# Patient Record
Sex: FEMALE | Race: WHITE | NOT HISPANIC OR LATINO | Employment: FULL TIME | URBAN - METROPOLITAN AREA
[De-identification: names, ages, dates, MRNs, and addresses within clinical notes are randomized per-mention and may not be internally consistent; named-entity substitution may affect disease eponyms.]

---

## 2017-11-14 ENCOUNTER — HOSPITAL ENCOUNTER (EMERGENCY)
Facility: HOSPITAL | Age: 27
Discharge: HOME | End: 2017-11-14
Admitting: EMERGENCY MEDICINE
Payer: COMMERCIAL

## 2017-11-14 ENCOUNTER — APPOINTMENT (EMERGENCY)
Dept: CT IMAGING | Facility: HOSPITAL | Age: 27
End: 2017-11-14
Payer: COMMERCIAL

## 2017-11-14 VITALS
HEART RATE: 78 BPM | OXYGEN SATURATION: 100 % | BODY MASS INDEX: 18.99 KG/M2 | WEIGHT: 114 LBS | SYSTOLIC BLOOD PRESSURE: 110 MMHG | TEMPERATURE: 98.4 F | RESPIRATION RATE: 16 BRPM | DIASTOLIC BLOOD PRESSURE: 59 MMHG | HEIGHT: 65 IN

## 2017-11-14 DIAGNOSIS — N12 PYELONEPHRITIS: Primary | ICD-10-CM

## 2017-11-14 LAB
ALBUMIN SERPL BCP-MCNC: 4.3 G/DL (ref 3.5–5)
ALP SERPL-CCNC: 72 U/L (ref 46–116)
ALT SERPL W P-5'-P-CCNC: 24 U/L (ref 12–78)
ANION GAP SERPL CALCULATED.3IONS-SCNC: 7 MMOL/L (ref 4–13)
AST SERPL W P-5'-P-CCNC: 17 U/L (ref 5–45)
BACTERIA UR QL AUTO: ABNORMAL /HPF
BASOPHILS # BLD AUTO: 0.03 THOUSANDS/ΜL (ref 0–0.1)
BASOPHILS NFR BLD AUTO: 1 % (ref 0–1)
BILIRUB SERPL-MCNC: 0.3 MG/DL (ref 0.2–1)
BILIRUB UR QL STRIP: NEGATIVE
BUN SERPL-MCNC: 13 MG/DL (ref 5–25)
CALCIUM SERPL-MCNC: 9.3 MG/DL (ref 8.3–10.1)
CHLORIDE SERPL-SCNC: 104 MMOL/L (ref 100–108)
CLARITY UR: CLEAR
CO2 SERPL-SCNC: 34 MMOL/L (ref 21–32)
COLOR UR: ABNORMAL
CREAT SERPL-MCNC: 1 MG/DL (ref 0.6–1.3)
EOSINOPHIL # BLD AUTO: 0.04 THOUSAND/ΜL (ref 0–0.61)
EOSINOPHIL NFR BLD AUTO: 1 % (ref 0–6)
ERYTHROCYTE [DISTWIDTH] IN BLOOD BY AUTOMATED COUNT: 11.8 % (ref 11.6–15.1)
EXT PREG TEST URINE: NORMAL
GFR SERPL CREATININE-BSD FRML MDRD: 77 ML/MIN/1.73SQ M
GLUCOSE SERPL-MCNC: 87 MG/DL (ref 65–140)
GLUCOSE UR STRIP-MCNC: ABNORMAL MG/DL
HCT VFR BLD AUTO: 43.3 % (ref 34.8–46.1)
HGB BLD-MCNC: 14.8 G/DL (ref 11.5–15.4)
HGB UR QL STRIP.AUTO: ABNORMAL
KETONES UR STRIP-MCNC: NEGATIVE MG/DL
LEUKOCYTE ESTERASE UR QL STRIP: ABNORMAL
LYMPHOCYTES # BLD AUTO: 1.22 THOUSANDS/ΜL (ref 0.6–4.47)
LYMPHOCYTES NFR BLD AUTO: 19 % (ref 14–44)
MCH RBC QN AUTO: 28.8 PG (ref 26.8–34.3)
MCHC RBC AUTO-ENTMCNC: 34.2 G/DL (ref 31.4–37.4)
MCV RBC AUTO: 84 FL (ref 82–98)
MONOCYTES # BLD AUTO: 0.37 THOUSAND/ΜL (ref 0.17–1.22)
MONOCYTES NFR BLD AUTO: 6 % (ref 4–12)
NEUTROPHILS # BLD AUTO: 4.85 THOUSANDS/ΜL (ref 1.85–7.62)
NEUTS SEG NFR BLD AUTO: 74 % (ref 43–75)
NITRITE UR QL STRIP: POSITIVE
NON-SQ EPI CELLS URNS QL MICRO: ABNORMAL /HPF
NRBC BLD AUTO-RTO: 0 /100 WBCS
PH UR STRIP.AUTO: 5.5 [PH] (ref 4.5–8)
PLATELET # BLD AUTO: 192 THOUSANDS/UL (ref 149–390)
PMV BLD AUTO: 9.4 FL (ref 8.9–12.7)
POTASSIUM SERPL-SCNC: 3.7 MMOL/L (ref 3.5–5.3)
PROT SERPL-MCNC: 7.9 G/DL (ref 6.4–8.2)
PROT UR STRIP-MCNC: ABNORMAL MG/DL
RBC # BLD AUTO: 5.14 MILLION/UL (ref 3.81–5.12)
RBC #/AREA URNS AUTO: ABNORMAL /HPF
SODIUM SERPL-SCNC: 145 MMOL/L (ref 136–145)
SP GR UR STRIP.AUTO: <=1.005 (ref 1–1.03)
UROBILINOGEN UR QL STRIP.AUTO: 4 E.U./DL
WBC # BLD AUTO: 6.53 THOUSAND/UL (ref 4.31–10.16)
WBC #/AREA URNS AUTO: ABNORMAL /HPF

## 2017-11-14 PROCEDURE — 96365 THER/PROPH/DIAG IV INF INIT: CPT

## 2017-11-14 PROCEDURE — 80053 COMPREHEN METABOLIC PANEL: CPT | Performed by: PHYSICIAN ASSISTANT

## 2017-11-14 PROCEDURE — 99284 EMERGENCY DEPT VISIT MOD MDM: CPT

## 2017-11-14 PROCEDURE — 85025 COMPLETE CBC W/AUTO DIFF WBC: CPT | Performed by: PHYSICIAN ASSISTANT

## 2017-11-14 PROCEDURE — 81001 URINALYSIS AUTO W/SCOPE: CPT | Performed by: PHYSICIAN ASSISTANT

## 2017-11-14 PROCEDURE — 96375 TX/PRO/DX INJ NEW DRUG ADDON: CPT

## 2017-11-14 PROCEDURE — 87086 URINE CULTURE/COLONY COUNT: CPT | Performed by: PHYSICIAN ASSISTANT

## 2017-11-14 PROCEDURE — 36415 COLL VENOUS BLD VENIPUNCTURE: CPT | Performed by: PHYSICIAN ASSISTANT

## 2017-11-14 PROCEDURE — 81025 URINE PREGNANCY TEST: CPT | Performed by: PHYSICIAN ASSISTANT

## 2017-11-14 PROCEDURE — 74176 CT ABD & PELVIS W/O CONTRAST: CPT

## 2017-11-14 PROCEDURE — 96361 HYDRATE IV INFUSION ADD-ON: CPT

## 2017-11-14 RX ORDER — KETOROLAC TROMETHAMINE 30 MG/ML
30 INJECTION, SOLUTION INTRAMUSCULAR; INTRAVENOUS ONCE
Status: COMPLETED | OUTPATIENT
Start: 2017-11-14 | End: 2017-11-14

## 2017-11-14 RX ORDER — ONDANSETRON 2 MG/ML
4 INJECTION INTRAMUSCULAR; INTRAVENOUS ONCE
Status: COMPLETED | OUTPATIENT
Start: 2017-11-14 | End: 2017-11-14

## 2017-11-14 RX ORDER — ONDANSETRON 4 MG/1
4 TABLET, ORALLY DISINTEGRATING ORAL EVERY 8 HOURS PRN
Qty: 20 TABLET | Refills: 0 | Status: SHIPPED | OUTPATIENT
Start: 2017-11-14 | End: 2017-11-21

## 2017-11-14 RX ADMIN — CEFTRIAXONE 1000 MG: 1 INJECTION, POWDER, FOR SOLUTION INTRAMUSCULAR; INTRAVENOUS at 21:28

## 2017-11-14 RX ADMIN — ONDANSETRON 4 MG: 2 INJECTION INTRAMUSCULAR; INTRAVENOUS at 19:29

## 2017-11-14 RX ADMIN — KETOROLAC TROMETHAMINE 30 MG: 30 INJECTION, SOLUTION INTRAMUSCULAR at 19:29

## 2017-11-14 RX ADMIN — SODIUM CHLORIDE 1000 ML: 0.9 INJECTION, SOLUTION INTRAVENOUS at 19:29

## 2017-11-15 NOTE — DISCHARGE INSTRUCTIONS
Kidney Infection   WHAT YOU NEED TO KNOW:   A kidney infection, or pyelonephritis, is a bacterial infection  The infection usually starts in your bladder or urethra and moves into your kidney  One or both kidneys may be infected  DISCHARGE INSTRUCTIONS:   Return to the emergency department if:   · You have a fever and chills  · You cannot stop vomiting  · You have severe pain in your abdomen, lower back, or sides  Contact your healthcare provider if:   · You continue to have a fever after you take antibiotics for 3 days  · You have pain when you urinate, even after treatment  · Your signs and symptoms return  · You have questions or concerns about your condition or care  Medicines: You may  need any of the following:  · Antibiotics  treat your bacterial infection  · Acetaminophen  decreases pain and fever  It is available without a doctor's order  Ask how much to take and how often to take it  Follow directions  Read the labels of all other medicines you are using to see if they also contain acetaminophen, or ask your doctor or pharmacist  Acetaminophen can cause liver damage if not taken correctly  Do not use more than 4 grams (4,000 milligrams) total of acetaminophen in one day  · NSAIDs , such as ibuprofen, help decrease swelling, pain, and fever  This medicine is available with or without a doctor's order  NSAIDs can cause stomach bleeding or kidney problems in certain people  If you take blood thinner medicine, always ask if NSAIDs are safe for you  Always read the medicine label and follow directions  Do not give these medicines to children under 10months of age without direction from your child's healthcare provider  · Prescription pain medicine  may be given  Ask how to take this medicine safely  · Take your medicine as directed  Contact your healthcare provider if you think your medicine is not helping or if you have side effects   Tell him of her if you are allergic to any medicine  Keep a list of the medicines, vitamins, and herbs you take  Include the amounts, and when and why you take them  Bring the list or the pill bottles to follow-up visits  Carry your medicine list with you in case of an emergency  Drink liquids as directed: You may need to drink extra liquids to help flush your kidneys and urinary system  Water is the best liquid to drink  Ask your healthcare provider how much liquid to drink each day and which liquids are best for you  Urinate as soon as you feel the urge: This will help flush bacteria from your urinary system  Do not wait or hold your urine for too long  Clean your genital area every day with soap and water:  Wipe from front to back after you urinate or have a bowel movement  Wear cotton underwear  Fabrics such as nylon and polyester can stay damp  This can increase your risk for infection  Urinate within 15 minutes after you have sex  Follow up with your healthcare provider as directed:  Write down your questions so you remember to ask them during your visits  © 2017 [facility] Information is for End User's use only and may not be sold, redistributed or otherwise used for commercial purposes  All illustrations and images included in CareNotes® are the copyrighted property of A D A M , Inc  or Miguelito Farrell  The above information is an  only  It is not intended as medical advice for individual conditions or treatments  Talk to your doctor, nurse or pharmacist before following any medical regimen to see if it is safe and effective for you

## 2017-11-15 NOTE — ED PROVIDER NOTES
History  Chief Complaint   Patient presents with    Abdominal Pain     Patient c/o abdominal pain that started last night after she ate  Patient states she is also having some lower back pain  She states after eating tonight she experienced the same pain in her stomach and back  Patient is also c/o nausea  Leilani Farrell is a 32 y o  female w PMH none significant who presents for evaluation of abdominal pain  Patient developed some lower abdominal pain last evening  It is located to the left lower quadrant but after eating this morning she then had severe pain in the lower back bilaterally  The pain of the lower back does radiate around to anterior aspect of the abdomen in the lower quadrants  Has some increased urinary frequency and was treated yesterday at the urgent care with Cipro for a urinary tract infection  Took 1 dose  No hematuria  None       History reviewed  No pertinent past medical history  History reviewed  No pertinent surgical history  History reviewed  No pertinent family history  I have reviewed and agree with the history as documented  Social History   Substance Use Topics    Smoking status: Former Smoker    Smokeless tobacco: Never Used    Alcohol use No        Review of Systems   Constitutional: Negative for chills, diaphoresis and fever  HENT: Negative for congestion and sore throat  Eyes: Negative for visual disturbance  Respiratory: Negative for cough, chest tightness, shortness of breath and wheezing  Cardiovascular: Negative for chest pain and leg swelling  Gastrointestinal: Positive for abdominal pain and nausea  Negative for constipation, diarrhea and vomiting  Genitourinary: Positive for flank pain and frequency  Negative for difficulty urinating, dysuria, hematuria, urgency, vaginal bleeding, vaginal discharge and vaginal pain  Musculoskeletal: Negative for arthralgias and myalgias     Neurological: Negative for dizziness, weakness, light-headedness, numbness and headaches  Psychiatric/Behavioral: The patient is not nervous/anxious  Physical Exam  ED Triage Vitals [11/14/17 1800]   Temperature Pulse Respirations Blood Pressure SpO2   98 4 °F (36 9 °C) 95 18 120/70 98 %      Temp Source Heart Rate Source Patient Position - Orthostatic VS BP Location FiO2 (%)   Oral Monitor Sitting Left arm --      Pain Score       --           Orthostatic Vital Signs  Vitals:    11/14/17 1800   BP: 120/70   Pulse: 95   Patient Position - Orthostatic VS: Sitting       Physical Exam   Constitutional: She is oriented to person, place, and time  She appears well-developed and well-nourished  No distress  uncomfortable in appearance but nontoxic    HENT:   Head: Normocephalic and atraumatic  Eyes: Pupils are equal, round, and reactive to light  Neck: Neck supple  No tracheal deviation present  Cardiovascular: Normal rate, regular rhythm and intact distal pulses  Exam reveals no gallop and no friction rub  No murmur heard  Pulmonary/Chest: Effort normal and breath sounds normal  No respiratory distress  She has no wheezes  She has no rales  Abdominal: Soft  Bowel sounds are normal  She exhibits no distension and no mass  There is no tenderness  There is no guarding  Lower abdominal pain, most notable in the LLQ and w mild pain of the L CVA region  No renal colic    Musculoskeletal: She exhibits no edema or deformity  Neurological: She is alert and oriented to person, place, and time  Skin: Skin is warm and dry  She is not diaphoretic  Psychiatric: She has a normal mood and affect  Her behavior is normal    Nursing note and vitals reviewed        ED Medications  Medications   ceftriaxone (ROCEPHIN) 1 g/50 mL in dextrose IVPB (1,000 mg Intravenous New Bag 11/14/17 2128)   ketorolac (TORADOL) 30 mg/mL injection 30 mg (30 mg Intravenous Given 11/14/17 1929)   sodium chloride 0 9 % bolus 1,000 mL (1,000 mL Intravenous New Bag 11/14/17 1929)   ondansetron (ZOFRAN) injection 4 mg (4 mg Intravenous Given 11/14/17 1929)       Diagnostic Studies  Results Reviewed     Procedure Component Value Units Date/Time    POCT pregnancy, urine [[id number]]  (Normal) Resulted:  11/14/17 2037    Lab Status:  Final result Updated:  11/14/17 2037     EXT PREG TEST UR (Ref: Negative) neg    Comprehensive metabolic panel [[id number]]  (Abnormal) Collected:  11/14/17 1929    Lab Status:  Final result Specimen:  Blood from Arm, Right Updated:  11/14/17 1958     Sodium 145 mmol/L      Potassium 3 7 mmol/L      Chloride 104 mmol/L      CO2 34 (H) mmol/L      Anion Gap 7 mmol/L      BUN 13 mg/dL      Creatinine 1 00 mg/dL      Glucose 87 mg/dL      Calcium 9 3 mg/dL      AST 17 U/L      ALT 24 U/L      Alkaline Phosphatase 72 U/L      Total Protein 7 9 g/dL      Albumin 4 3 g/dL      Total Bilirubin 0 30 mg/dL      eGFR 77 ml/min/1 73sq m     Narrative:         National Kidney Disease Education Program recommendations are as follows:  GFR calculation is accurate only with a steady state creatinine  Chronic Kidney disease less than 60 ml/min/1 73 sq  meters  Kidney failure less than 15 ml/min/1 73 sq  meters  Urine Microscopic [[id number]]  (Abnormal) Collected:  11/14/17 1929    Lab Status:  Final result Specimen:  Urine from Urine, Clean Catch Updated:  11/14/17 1949     RBC, UA 2-4 (A) /hpf      WBC, UA 20-30 (A) /hpf      Epithelial Cells Innumerable (A) /hpf      Bacteria, UA Moderate (A) /hpf     Urine culture [[id number]] Collected:  11/14/17 1929    Lab Status:   In process Specimen:  Urine from Urine, Clean Catch Updated:  11/14/17 1949    CBC and differential [[id number]]  (Abnormal) Collected:  11/14/17 1929    Lab Status:  Final result Specimen:  Blood from Arm, Right Updated:  11/14/17 1944     WBC 6 53 Thousand/uL      RBC 5 14 (H) Million/uL      Hemoglobin 14 8 g/dL      Hematocrit 43 3 %      MCV 84 fL      MCH 28 8 pg      MCHC 34 2 g/dL      RDW 11 8 % MPV 9 4 fL      Platelets 823 Thousands/uL      nRBC 0 /100 WBCs      Neutrophils Relative 74 %      Lymphocytes Relative 19 %      Monocytes Relative 6 %      Eosinophils Relative 1 %      Basophils Relative 1 %      Neutrophils Absolute 4 85 Thousands/µL      Lymphocytes Absolute 1 22 Thousands/µL      Monocytes Absolute 0 37 Thousand/µL      Eosinophils Absolute 0 04 Thousand/µL      Basophils Absolute 0 03 Thousands/µL     UA w Reflex to Microscopic w Reflex to Culture [[id number]]  (Abnormal) Collected:  11/14/17 1929    Lab Status:  Final result Specimen:  Urine from Urine, Clean Catch Updated:  11/14/17 1942     Color, UA Orange     Clarity, UA Clear     Specific Gravity, UA <=1 005     pH, UA 5 5     Leukocytes, UA Large (A)     Nitrite, UA Positive (A)     Protein, UA 30 (1+) (A) mg/dl      Glucose,  (1/10%) (A) mg/dl      Ketones, UA Negative mg/dl      Urobilinogen, UA 4 0 (A) E U /dl      Bilirubin, UA Negative     Blood, UA Trace-Intact (A)                 CT renal stone study abdomen pelvis wo contrast   Final Result by Stanislaw Putnam MD (11/14 2059)      No hydronephrosis  No nephroureterolithiasis  No acute abnormality evident  Workstation performed: [id number]                    Procedures  Procedures       Phone Contacts  ED Phone Contact    ED Course  ED Course                                MDM  Number of Diagnoses or Management Options  Pyelonephritis:   Diagnosis management comments: DDX includes but not ltd to:   Pyelo vs uti vs diverticulitis vs kidney stone vs ectopic     Plan is to obtain:  UA for UTI, hydration status, hematuria   Urine preg  CT renal stone study for hydro / stone   CBC to check for anemia, leukocytosis, hydration status  Chemistry panel to check for lyte abnormalities, organ function     Based on results:  Suspect pyelo  Follow w PCP and urology  Give IV dose meds here  She is nontoxic and appropriate for outpt disposition   She can continue the cipro and we will notify if she needs adjustment to abx  Return parameters discussed  Pt requires f/u as an outpt  Pt expresses understanding w above treatment plan  All questions answered prior to d/c  Portions of the record may have been created with voice recognition software   Occasional wrong word or "sound a like" substitutions may have occurred due to the inherent limitations of voice recognition software   Read the chart carefully and recognize, using context, where substitutions have occurred  CritCare Time    Disposition  Final diagnoses:   Pyelonephritis     Time reflects when diagnosis was documented in both MDM as applicable and the Disposition within this note     Time User Action Codes Description Comment    11/14/2017  9:24 PM Hortensia Saenz Add [N12] Pyelonephritis       ED Disposition     ED Disposition Condition Comment    Discharge  Heather E Transue discharge to home/self care  Condition at discharge: Good        Follow-up Information     Follow up With Specialties Details Why Contact Info Additional Information    [address] [facility] Emergency Department Emergency Medicine  If symptoms worsen [address]  [phone] [place] ED, [address] [place], [place], [address] [place] [address] for [place]  Call in 1 day  [address] [address] [address]  [place] [address]  [phone]         Patient's Medications   Discharge Prescriptions    ONDANSETRON (ZOFRAN-ODT) 4 MG DISINTEGRATING TABLET    Take 1 tablet by mouth every 8 (eight) hours as needed for nausea for up to 7 days       Start Date: 11/14/2017End Date: 11/21/2017       Order Dose: 4 mg       Quantity: 20 tablet    Refills: 0     No discharge procedures on file      ED Provider  Electronically Signed by           Ramana Wilson PA-C  11/14/17 1941

## 2017-11-16 LAB — BACTERIA UR CULT: ABNORMAL

## 2018-01-19 ENCOUNTER — HOSPITAL ENCOUNTER (EMERGENCY)
Facility: HOSPITAL | Age: 28
Discharge: HOME | End: 2018-01-19
Attending: EMERGENCY MEDICINE | Admitting: EMERGENCY MEDICINE
Payer: COMMERCIAL

## 2018-01-19 ENCOUNTER — APPOINTMENT (EMERGENCY)
Dept: RADIOLOGY | Facility: HOSPITAL | Age: 28
End: 2018-01-19
Payer: COMMERCIAL

## 2018-01-19 VITALS
OXYGEN SATURATION: 99 % | TEMPERATURE: 97.3 F | HEART RATE: 70 BPM | DIASTOLIC BLOOD PRESSURE: 65 MMHG | SYSTOLIC BLOOD PRESSURE: 131 MMHG | WEIGHT: 114 LBS | RESPIRATION RATE: 18 BRPM | BODY MASS INDEX: 18.99 KG/M2 | HEIGHT: 65 IN

## 2018-01-19 DIAGNOSIS — J40 BRONCHITIS: Primary | ICD-10-CM

## 2018-01-19 LAB — EXT PREG TEST URINE: NEGATIVE

## 2018-01-19 PROCEDURE — 99283 EMERGENCY DEPT VISIT LOW MDM: CPT

## 2018-01-19 PROCEDURE — 71046 X-RAY EXAM CHEST 2 VIEWS: CPT

## 2018-01-19 PROCEDURE — 81025 URINE PREGNANCY TEST: CPT | Performed by: EMERGENCY MEDICINE

## 2018-01-20 NOTE — ED PROVIDER NOTES
History  Chief Complaint   Patient presents with    Cough     Patient reoprts cough over the past couple weeks  Patient was put on a z-pack and it did not help the cough  Patient reports pain under her ribs from coughing     HPI  Patient is a 59-year-old female, she reports cough and congestion over the last 2 weeks, apparently seen at another hospital and placed on Z-Maximus for with the patient describes as itchy ears and a cough  She reports that the medication did not work and she has a persistent cough  Patient reports she quit smoking approximately 3 months ago  She denies any shortness of breath  She denies any chest pain  There is no hemoptysis  She denies any fever  She denies any rash  There is no vomiting or diarrhea  She reports just a persistent hacking cough tonight she coughed and felt a pull in her chest muscles so she decided to come to the hospital   Past medical history previously healthy  Family history noncontributory  Social history former smoker no history of drug abuse  Prior to Admission Medications   Prescriptions Last Dose Informant Patient Reported? Taking?   ondansetron (ZOFRAN-ODT) 4 mg disintegrating tablet   No No   Sig: Take 1 tablet by mouth every 8 (eight) hours as needed for nausea for up to 7 days      Facility-Administered Medications: None       History reviewed  No pertinent past medical history  History reviewed  No pertinent surgical history  History reviewed  No pertinent family history  I have reviewed and agree with the history as documented  Social History   Substance Use Topics    Smoking status: Former Smoker    Smokeless tobacco: Never Used    Alcohol use No        Review of Systems   Constitutional: Negative for diaphoresis, fatigue and fever  HENT: Negative for congestion, ear pain, nosebleeds and sore throat  Eyes: Negative for photophobia, pain, discharge and visual disturbance  Respiratory: Positive for cough   Negative for choking, chest tightness, shortness of breath and wheezing  Cardiovascular: Negative for chest pain and palpitations  Gastrointestinal: Negative for abdominal distention, abdominal pain, diarrhea and vomiting  Genitourinary: Negative for dysuria, flank pain and frequency  Musculoskeletal: Negative for back pain, gait problem and joint swelling  Skin: Negative for color change and rash  Neurological: Negative for dizziness, syncope and headaches  Psychiatric/Behavioral: Negative for behavioral problems and confusion  The patient is not nervous/anxious  All other systems reviewed and are negative  Physical Exam  ED Triage Vitals [01/19/18 2143]   Temperature Pulse Respirations Blood Pressure SpO2   (!) 97 3 °F (36 3 °C) 70 18 131/65 99 %      Temp Source Heart Rate Source Patient Position - Orthostatic VS BP Location FiO2 (%)   Temporal Monitor Sitting Left arm --      Pain Score       6           Orthostatic Vital Signs  Vitals:    01/19/18 2143   BP: 131/65   Pulse: 70   Patient Position - Orthostatic VS: Sitting       Physical Exam   Constitutional: She is oriented to person, place, and time  She appears well-developed and well-nourished  HENT:   Head: Normocephalic  Right Ear: External ear normal    Left Ear: External ear normal    Nose: Nose normal    Mouth/Throat: Oropharynx is clear and moist    Eyes: EOM and lids are normal  Pupils are equal, round, and reactive to light  Neck: Normal range of motion  Neck supple  Cardiovascular: Normal rate, regular rhythm, normal heart sounds and intact distal pulses  Pulmonary/Chest: Effort normal and breath sounds normal  No respiratory distress  She has no wheezes  She has no rales  She exhibits tenderness  Abdominal: Soft  Bowel sounds are normal  She exhibits no distension and no mass  There is no tenderness  There is no rebound and no guarding  No hernia  Musculoskeletal: Normal range of motion  She exhibits no deformity     Neurological: She is alert and oriented to person, place, and time  Skin: Skin is warm and dry  Psychiatric: She has a normal mood and affect  Nursing note and vitals reviewed  Pulse oximetry 99% on room air adequate oxygenation, there is no hypoxia    ED Medications  Medications - No data to display    Diagnostic Studies  Results Reviewed     Procedure Component Value Units Date/Time    POCT pregnancy, urine [[id number]]  (Normal) Resulted:  01/19/18 2256    Lab Status:  Final result Updated:  01/19/18 2256     EXT PREG TEST UR (Ref: Negative) negative                 XR chest pa & lateral    (Results Pending)              Procedures  Procedures       Phone Contacts  ED Phone Contact    ED Course  ED Course             Chest x-ray: Chest x-ray showed a normal cardiac silhouette, no pneumothorax no infiltrates, No sign of pathology, interpreted by me, I was the primary   MDM medical decision making 70-year-old female with cough and congestion recently stop smoking, apparently treated with Zithromax but no relief  Patient presents with persistent cough no wheezing, no wheezing with forced expiration comma chest x-ray was normal, discussed with patient most consistent with viral bronchitis, no indication for antibiotics again  Discussed outpatient treatment and follow-up  CritCare Time    Disposition  Final diagnoses:   Bronchitis     Time reflects when diagnosis was documented in both MDM as applicable and the Disposition within this note     Time User Action Codes Description Comment    1/19/2018 11:51 PM Ric Corbin Bronchitis       ED Disposition     ED Disposition Condition Comment    Discharge  Heather Sherman discharge to home/self care      Condition at discharge: Good        Follow-up Information     Follow up With Specialties Details Why [address],  Internal Medicine   [address] [address]  [phone]          Discharge Medication List as of 1/19/2018 11:52 PM      CONTINUE these medications which have NOT CHANGED    Details   ondansetron (ZOFRAN-ODT) 4 mg disintegrating tablet Take 1 tablet by mouth every 8 (eight) hours as needed for nausea for up to 7 days, Starting Tue 11/14/2017, Until Tue 11/21/2017, Print           No discharge procedures on file      ED Provider  Electronically Signed by           Rommel Merchant MD  01/20/18 6752

## 2018-01-20 NOTE — DISCHARGE INSTRUCTIONS
Rest  Tylenol  Honey for coughing  Follow up with a family doctor  Acute Bronchitis   WHAT YOU NEED TO KNOW:   Acute bronchitis is swelling and irritation in the air passages of your lungs  This irritation may cause you to cough or have other breathing problems  Acute bronchitis often starts because of another illness, such as a cold or the flu  The illness spreads from your nose and throat to your windpipe and airways  Bronchitis is often called a chest cold  Acute bronchitis lasts about 3 to 6 weeks and is usually not a serious illness  Your cough can last for several weeks  DISCHARGE INSTRUCTIONS:   Return to the emergency department if:   · You cough up blood  · Your lips or fingernails turn blue  · You feel like you are not getting enough air when you breathe  Contact your healthcare provider if:   · You have a fever  · Your breathing problems do not go away or get worse  · Your cough does not get better within 4 weeks  · You have questions or concerns about your condition or care  Self-care:   · Get more rest   Rest helps your body to heal  Slowly start to do more each day  Rest when you feel it is needed  · Avoid irritants in the air  Avoid chemicals, fumes, and dust  Wear a face mask if you must work around dust or fumes  Stay inside on days when air pollution levels are high  If you have allergies, stay inside when pollen counts are high  Do not use aerosol products, such as spray-on deodorant, bug spray, and hair spray  · Do not smoke or be around others who smoke  Nicotine and other chemicals in cigarettes and cigars damages the cilia that move mucus out of your lungs  Ask your healthcare provider for information if you currently smoke and need help to quit  E-cigarettes or smokeless tobacco still contain nicotine  Talk to your healthcare provider before you use these products  · Drink liquids as directed    Liquids help keep your air passages moist and help you cough up mucus  You may need to drink more liquids when you have acute bronchitis  Ask how much liquid to drink each day and which liquids are best for you  · Use a humidifier or vaporizer  Use a cool mist humidifier or a vaporizer to increase air moisture in your home  This may make it easier for you to breathe and help decrease your cough  Decrease risk for acute bronchitis:   · Get the vaccinations you need  Ask your healthcare provider if you should get vaccinated against the flu or pneumonia  · Prevent the spread of germs  You can decrease your risk of acute bronchitis and other illnesses by doing the following:     [facility] your hands often with soap and water  Carry germ-killing hand lotion or gel with you  You can use the lotion or gel to clean your hands when soap and water are not available  ¨ Do not touch your eyes, nose, or mouth unless you have washed your hands first     ¨ Always cover your mouth when you cough to prevent the spread of germs  It is best to cough into a tissue or your shirt sleeve instead of into your hand  Ask those around you cover their mouths when they cough  ¨ Try to avoid people who have a cold or the flu  If you are sick, stay away from others as much as possible  Medicines: Your healthcare provider may  give you any of the following:  · Ibuprofen or acetaminophen  are medicines that help lower your fever  They are available without a doctor's order  Ask your healthcare provider which medicine is right for you  Ask how much to take and how often to take it  Follow directions  These medicines can cause stomach bleeding if not taken correctly  Ibuprofen can cause kidney damage  Do not take ibuprofen if you have kidney disease, an ulcer, or allergies to aspirin  Acetaminophen can cause liver damage  Do not take more than 4,000 milligrams in 24 hours  · Decongestants  help loosen mucus in your lungs and make it easier to cough up   This can help you breathe easier  · Cough suppressants  decrease your urge to cough  If your cough produces mucus, do not take a cough suppressant unless your healthcare provider tells you to  Your healthcare provider may suggest that you take a cough suppressant at night so you can rest     · Inhalers  may be given  Your healthcare provider may give you one or more inhalers to help you breathe easier and cough less  An inhaler gives your medicine to open your airways  Ask your healthcare provider to show you how to use your inhaler correctly  · Take your medicine as directed  Contact your healthcare provider if you think your medicine is not helping or if you have side effects  Tell him of her if you are allergic to any medicine  Keep a list of the medicines, vitamins, and herbs you take  Include the amounts, and when and why you take them  Bring the list or the pill bottles to follow-up visits  Carry your medicine list with you in case of an emergency  Follow up with your healthcare provider as directed:  Write down questions you have so you will remember to ask them during your follow-up visits  © 2017 [employer] [employer] Information is for End User's use only and may not be sold, redistributed or otherwise used for commercial purposes  All illustrations and images included in CareNotes® are the copyrighted property of [employer]  or [address]  The above information is an  only  It is not intended as medical advice for individual conditions or treatments  Talk to your doctor, nurse or pharmacist before following any medical regimen to see if it is safe and effective for you

## 2018-12-11 ENCOUNTER — HOSPITAL ENCOUNTER (EMERGENCY)
Facility: HOSPITAL | Age: 28
Discharge: HOME | End: 2018-12-11
Attending: EMERGENCY MEDICINE
Payer: COMMERCIAL

## 2018-12-11 VITALS
DIASTOLIC BLOOD PRESSURE: 72 MMHG | SYSTOLIC BLOOD PRESSURE: 130 MMHG | RESPIRATION RATE: 20 BRPM | WEIGHT: 113.98 LBS | TEMPERATURE: 98 F | HEART RATE: 70 BPM | OXYGEN SATURATION: 99 % | BODY MASS INDEX: 18.99 KG/M2 | HEIGHT: 65 IN

## 2018-12-11 DIAGNOSIS — R11.10 VOMITING: Primary | ICD-10-CM

## 2018-12-11 DIAGNOSIS — Z34.91 FIRST TRIMESTER PREGNANCY: ICD-10-CM

## 2018-12-11 DIAGNOSIS — O21.0 HYPEREMESIS GRAVIDARUM: ICD-10-CM

## 2018-12-11 LAB
ALBUMIN SERPL BCP-MCNC: 4 G/DL (ref 3.5–5)
ALP SERPL-CCNC: 54 U/L (ref 46–116)
ALT SERPL W P-5'-P-CCNC: 17 U/L (ref 12–78)
ANION GAP SERPL CALCULATED.3IONS-SCNC: 9 MMOL/L (ref 4–13)
AST SERPL W P-5'-P-CCNC: 13 U/L (ref 5–45)
B-HCG SERPL-ACNC: ABNORMAL MIU/ML
BACTERIA UR QL AUTO: ABNORMAL /HPF
BASOPHILS # BLD AUTO: 0.05 THOUSANDS/ΜL (ref 0–0.1)
BASOPHILS NFR BLD AUTO: 1 % (ref 0–1)
BILIRUB DIRECT SERPL-MCNC: 0.08 MG/DL (ref 0–0.2)
BILIRUB SERPL-MCNC: 0.2 MG/DL (ref 0.2–1)
BILIRUB UR QL STRIP: NEGATIVE
BUN SERPL-MCNC: 9 MG/DL (ref 5–25)
CALCIUM SERPL-MCNC: 8.9 MG/DL (ref 8.3–10.1)
CHLORIDE SERPL-SCNC: 102 MMOL/L (ref 100–108)
CLARITY UR: CLEAR
CO2 SERPL-SCNC: 27 MMOL/L (ref 21–32)
COLOR UR: ABNORMAL
CREAT SERPL-MCNC: 0.6 MG/DL (ref 0.6–1.3)
EOSINOPHIL # BLD AUTO: 0.06 THOUSAND/ΜL (ref 0–0.61)
EOSINOPHIL NFR BLD AUTO: 1 % (ref 0–6)
ERYTHROCYTE [DISTWIDTH] IN BLOOD BY AUTOMATED COUNT: 12 % (ref 11.6–15.1)
GFR SERPL CREATININE-BSD FRML MDRD: 124 ML/MIN/1.73SQ M
GLUCOSE SERPL-MCNC: 93 MG/DL (ref 65–140)
GLUCOSE UR STRIP-MCNC: NEGATIVE MG/DL
HCT VFR BLD AUTO: 38.5 % (ref 34.8–46.1)
HGB BLD-MCNC: 13.2 G/DL (ref 11.5–15.4)
HGB UR QL STRIP.AUTO: NEGATIVE
IMM GRANULOCYTES # BLD AUTO: 0.03 THOUSAND/UL (ref 0–0.2)
IMM GRANULOCYTES NFR BLD AUTO: 0 % (ref 0–2)
KETONES UR STRIP-MCNC: NEGATIVE MG/DL
LEUKOCYTE ESTERASE UR QL STRIP: ABNORMAL
LYMPHOCYTES # BLD AUTO: 1.34 THOUSANDS/ΜL (ref 0.6–4.47)
LYMPHOCYTES NFR BLD AUTO: 18 % (ref 14–44)
MCH RBC QN AUTO: 29.2 PG (ref 26.8–34.3)
MCHC RBC AUTO-ENTMCNC: 34.3 G/DL (ref 31.4–37.4)
MCV RBC AUTO: 85 FL (ref 82–98)
MONOCYTES # BLD AUTO: 0.43 THOUSAND/ΜL (ref 0.17–1.22)
MONOCYTES NFR BLD AUTO: 6 % (ref 4–12)
NEUTROPHILS # BLD AUTO: 5.7 THOUSANDS/ΜL (ref 1.85–7.62)
NEUTS SEG NFR BLD AUTO: 74 % (ref 43–75)
NITRITE UR QL STRIP: NEGATIVE
NON-SQ EPI CELLS URNS QL MICRO: ABNORMAL /HPF
NRBC BLD AUTO-RTO: 0 /100 WBCS
PH UR STRIP.AUTO: 7 [PH] (ref 4.5–8)
PLATELET # BLD AUTO: 180 THOUSANDS/UL (ref 149–390)
PMV BLD AUTO: 9.8 FL (ref 8.9–12.7)
POTASSIUM SERPL-SCNC: 3.6 MMOL/L (ref 3.5–5.3)
PROT SERPL-MCNC: 7 G/DL (ref 6.4–8.2)
PROT UR STRIP-MCNC: NEGATIVE MG/DL
RBC # BLD AUTO: 4.52 MILLION/UL (ref 3.81–5.12)
RBC #/AREA URNS AUTO: ABNORMAL /HPF
SODIUM SERPL-SCNC: 138 MMOL/L (ref 136–145)
SP GR UR STRIP.AUTO: 1.01 (ref 1–1.03)
UROBILINOGEN UR QL STRIP.AUTO: 0.2 E.U./DL
WBC # BLD AUTO: 7.61 THOUSAND/UL (ref 4.31–10.16)
WBC #/AREA URNS AUTO: ABNORMAL /HPF

## 2018-12-11 PROCEDURE — 84702 CHORIONIC GONADOTROPIN TEST: CPT | Performed by: EMERGENCY MEDICINE

## 2018-12-11 PROCEDURE — 85025 COMPLETE CBC W/AUTO DIFF WBC: CPT | Performed by: EMERGENCY MEDICINE

## 2018-12-11 PROCEDURE — 96361 HYDRATE IV INFUSION ADD-ON: CPT

## 2018-12-11 PROCEDURE — 96374 THER/PROPH/DIAG INJ IV PUSH: CPT

## 2018-12-11 PROCEDURE — 36415 COLL VENOUS BLD VENIPUNCTURE: CPT | Performed by: EMERGENCY MEDICINE

## 2018-12-11 PROCEDURE — 81001 URINALYSIS AUTO W/SCOPE: CPT | Performed by: EMERGENCY MEDICINE

## 2018-12-11 PROCEDURE — 80048 BASIC METABOLIC PNL TOTAL CA: CPT | Performed by: EMERGENCY MEDICINE

## 2018-12-11 PROCEDURE — 99284 EMERGENCY DEPT VISIT MOD MDM: CPT

## 2018-12-11 PROCEDURE — 80076 HEPATIC FUNCTION PANEL: CPT | Performed by: EMERGENCY MEDICINE

## 2018-12-11 PROCEDURE — 87086 URINE CULTURE/COLONY COUNT: CPT | Performed by: EMERGENCY MEDICINE

## 2018-12-11 RX ORDER — ONDANSETRON 4 MG/1
4 TABLET, ORALLY DISINTEGRATING ORAL EVERY 6 HOURS PRN
Qty: 20 TABLET | Refills: 0 | Status: SHIPPED | OUTPATIENT
Start: 2018-12-11 | End: 2020-02-10

## 2018-12-11 RX ORDER — ONDANSETRON 2 MG/ML
4 INJECTION INTRAMUSCULAR; INTRAVENOUS ONCE
Status: COMPLETED | OUTPATIENT
Start: 2018-12-11 | End: 2018-12-11

## 2018-12-11 RX ADMIN — ONDANSETRON 4 MG: 2 INJECTION INTRAMUSCULAR; INTRAVENOUS at 21:37

## 2018-12-11 RX ADMIN — SODIUM CHLORIDE 2000 ML: 0.9 INJECTION, SOLUTION INTRAVENOUS at 19:18

## 2018-12-11 NOTE — ED PROVIDER NOTES
History  Chief Complaint   Patient presents with    Vomiting During Pregnancy     pt with vomiting, between 6-8 weeks pregnant      HPI  59-year-old  3 para 26 female approximately 6-8 weeks pregnant presents with a chief complaint of vomiting and nausea  Patient states it started about 2 weeks ago and has been getting progressively worse  Patient states that she was getting a little lightheaded and dizzy while at work today  Patient states that she is able to keep down her prenatal vitamins however shortly thereafter she starts to vomit with any food or liquids  Patient denies any abdominal pain or vaginal bleeding  Patient denies any morning sickness with her 1st pregnancy  Prior to Admission Medications   Prescriptions Last Dose Informant Patient Reported? Taking? Prenatal Multivit-Min-Fe-FA (PRE- PO) Unknown at Unknown time  Yes No   Sig: Take by mouth   ondansetron (ZOFRAN-ODT) 4 mg disintegrating tablet   No No   Sig: Take 1 tablet by mouth every 8 (eight) hours as needed for nausea for up to 7 days      Facility-Administered Medications: None       History reviewed  No pertinent past medical history  History reviewed  No pertinent surgical history  History reviewed  No pertinent family history  I have reviewed and agree with the history as documented  Social History   Substance Use Topics    Smoking status: Former Smoker    Smokeless tobacco: Never Used    Alcohol use No        Review of Systems   Constitutional: Positive for appetite change  Negative for chills and fever  HENT: Negative for congestion and rhinorrhea  Eyes: Negative for discharge and visual disturbance  Respiratory: Negative for shortness of breath and wheezing  Cardiovascular: Negative for chest pain and palpitations  Gastrointestinal: Positive for nausea and vomiting  Negative for abdominal pain  Endocrine: Negative for polydipsia and polyuria     Genitourinary: Negative for dysuria and hematuria  Musculoskeletal: Negative for arthralgias, gait problem and neck stiffness  Skin: Positive for pallor  Negative for rash and wound  Neurological: Negative for dizziness and headaches  Psychiatric/Behavioral: Negative for confusion and suicidal ideas  Physical Exam  Physical Exam   Constitutional: She is oriented to person, place, and time  She appears well-developed and well-nourished  60-year-old white female sitting on the stretcher some distress with nausea  Patient is pale  HENT:   Head: Normocephalic and atraumatic  Positive dry lips   Eyes: Pupils are equal, round, and reactive to light  EOM are normal    Neck: Normal range of motion  Neck supple  Cardiovascular: Normal rate, regular rhythm and normal heart sounds  Pulmonary/Chest: Effort normal and breath sounds normal  No respiratory distress  She has no wheezes  Abdominal: Soft  Bowel sounds are normal  There is no tenderness  There is no rebound and no guarding  Musculoskeletal: Normal range of motion  Neurological: She is alert and oriented to person, place, and time  No cranial nerve deficit  She exhibits normal muscle tone  Coordination normal    Skin: Skin is warm and dry  There is pallor  Psychiatric: She has a normal mood and affect  Nursing note and vitals reviewed        Vital Signs  ED Triage Vitals [12/11/18 1754]   Temperature Pulse Respirations Blood Pressure SpO2   98 °F (36 7 °C) 83 18 111/68 100 %      Temp Source Heart Rate Source Patient Position - Orthostatic VS BP Location FiO2 (%)   Oral Monitor Sitting Left arm --      Pain Score       5           Vitals:    12/11/18 1754 12/11/18 2015 12/11/18 2245   BP: 111/68 122/70 130/72   Pulse: 83 77 70   Patient Position - Orthostatic VS: Sitting Sitting Sitting       Visual Acuity      ED Medications  Medications   sodium chloride 0 9 % bolus 2,000 mL (0 mL Intravenous Stopped 12/11/18 2118)   ondansetron (ZOFRAN) injection 4 mg (4 mg Intravenous Given 12/11/18 2137)       Diagnostic Studies  Results Reviewed     Procedure Component Value Units Date/Time    hCG, quantitative [[id number]]  (Abnormal) Collected:  12/11/18 1918    Lab Status:  Final result Specimen:  Blood from Arm, Right Updated:  12/11/18 2310     HCG, Quant 056,634 (H) mIU/mL     Narrative:          Expected Ranges:     Approximate               Approximate HCG  Gestation age          Concentration ( mIU/mL)  _____________          ______________________   Zula Him                      HCG values  0 2-1                       5-50  1-2                           2-3                         100-5000  3-4                         500-58105  4-5                         1000-73320  5-6                         [id number]  6-8                         [id number]  8-12                        [id number]    Urine culture [[id number]] Collected:  12/11/18 2124    Lab Status: In process Specimen:  Urine Updated:  12/11/18 2124    Urine Microscopic [[id number]]  (Abnormal) Collected:  12/11/18 2016    Lab Status:  Final result Specimen:  Urine from Urine, Clean Catch Updated:  12/11/18 2039     RBC, UA None Seen /hpf      WBC, UA 2-4 (A) /hpf      Epithelial Cells Occasional /hpf      Bacteria, UA None Seen /hpf      URINE COMMENT --    UA w Reflex to Microscopic w Reflex to Culture [[id number]]  (Abnormal) Collected:  12/11/18 2016    Lab Status:  Final result Specimen:  Urine from Urine, Clean Catch Updated:  12/11/18 2024     Color, UA Light Yellow     Clarity, UA Clear     Specific Gravity, UA 1 015     pH, UA 7 0     Leukocytes, UA Moderate (A)     Nitrite, UA Negative     Protein, UA Negative mg/dl      Glucose, UA Negative mg/dl      Ketones, UA Negative mg/dl      Urobilinogen, UA 0 2 E U /dl      Bilirubin, UA Negative     Blood, UA Negative     URINE COMMENT --    Urine culture [[id number]] Collected:  12/11/18 2016    Lab Status:   In process Specimen:  Urine from Urine, Clean Catch Updated: 12/11/18 3528    Basic metabolic panel [[id number]] Collected:  12/11/18 1918    Lab Status:  Final result Specimen:  Blood from Arm, Right Updated:  12/11/18 1938     Sodium 138 mmol/L      Potassium 3 6 mmol/L      Chloride 102 mmol/L      CO2 27 mmol/L      ANION GAP 9 mmol/L      BUN 9 mg/dL      Creatinine 0 60 mg/dL      Glucose 93 mg/dL      Calcium 8 9 mg/dL      eGFR 124 ml/min/1 73sq m     Narrative:         National Kidney Disease Education Program recommendations are as follows:  GFR calculation is accurate only with a steady state creatinine  Chronic Kidney disease less than 60 ml/min/1 73 sq  meters  Kidney failure less than 15 ml/min/1 73 sq  meters      Hepatic function panel [[id number]]  (Normal) Collected:  12/11/18 1918    Lab Status:  Final result Specimen:  Blood from Arm, Right Updated:  12/11/18 1938     Total Bilirubin 0 20 mg/dL      Bilirubin, Direct 0 08 mg/dL      Alkaline Phosphatase 54 U/L      AST 13 U/L      ALT 17 U/L      Total Protein 7 0 g/dL      Albumin 4 0 g/dL     CBC and differential [[id number]] Collected:  12/11/18 1918    Lab Status:  Final result Specimen:  Blood from Arm, Right Updated:  12/11/18 1927     WBC 7 61 Thousand/uL      RBC 4 52 Million/uL      Hemoglobin 13 2 g/dL      Hematocrit 38 5 %      MCV 85 fL      MCH 29 2 pg      MCHC 34 3 g/dL      RDW 12 0 %      MPV 9 8 fL      Platelets 911 Thousands/uL      nRBC 0 /100 WBCs      Neutrophils Relative 74 %      Immat GRANS % 0 %      Lymphocytes Relative 18 %      Monocytes Relative 6 %      Eosinophils Relative 1 %      Basophils Relative 1 %      Neutrophils Absolute 5 70 Thousands/µL      Immature Grans Absolute 0 03 Thousand/uL      Lymphocytes Absolute 1 34 Thousands/µL      Monocytes Absolute 0 43 Thousand/µL      Eosinophils Absolute 0 06 Thousand/µL      Basophils Absolute 0 05 Thousands/µL                  No orders to display              Procedures  Procedures       Phone Contacts  ED Phone Contact    ED Course      I re-examined patient multiple times  Patient is feeling little better and is trying to eat some crackers  Patient was still a little nauseous so I ordered 4 mg of IV Zofran  10:30 p m  Re-evaluated patient patient states her nausea is a little better  She has been able to keep down the water as well as the crackers  11:26 p m :  Patient looks much better  Tolerating liquids  Her quantitative beta was 146,000 which is right where she is 6-8 weeks of pregnancy  I gave patient a copy of her labs to go  [facility]  [facility] Time     Differential diagnosis includes:  1  Vomiting  2  First trimester pregnancy  3  Hyperemesis gravidarum  4  Dehydration  5  Nausea     Disposition  Final diagnoses:   Vomiting   First trimester pregnancy   Hyperemesis gravidarum     Time reflects when diagnosis was documented in both MDM as applicable and the Disposition within this note     Time User Action Codes Description Comment    12/11/2018 10:41 PM Devin Eaton Add [R11 10] Vomiting     12/11/2018 10:42 PM Devin Eaton Add [Z34 91] First trimester pregnancy     12/11/2018 10:45 PM Devin Eaton Add [O21 0] Hyperemesis gravidarum       ED Disposition     ED Disposition Condition Comment    Discharge  Condition:  Good      Follow-up Information     Follow up With Specialties Details Why [facility] as scheduled on the 20th              Patient's Medications   Discharge Prescriptions    ONDANSETRON (ZOFRAN-ODT) 4 MG DISINTEGRATING TABLET    Take 1 tablet (4 mg total) by mouth every 6 (six) hours as needed for nausea or vomiting for up to 30 doses       Start Date: 12/11/2018End Date: --       Order Dose: 4 mg       Quantity: 20 tablet    Refills: 0     No discharge procedures on file      ED Provider  Electronically Signed by           Anay Giles DO  12/11/18 7348

## 2018-12-12 LAB
BACTERIA UR CULT: NORMAL
BACTERIA UR CULT: NORMAL

## 2018-12-12 NOTE — DISCHARGE INSTRUCTIONS
Hyperemesis Gravidarum   WHAT YOU NEED TO KNOW:   Hyperemesis gravidarum is a severe form of nausea and vomiting that happens during pregnancy  Hyperemesis is more severe than morning sickness  It may cause you to have nausea or vomiting all day for many days  It may also keep you from getting enough food and liquid  DISCHARGE INSTRUCTIONS:   Return to the emergency department if:   · You have signs of severe dehydration including little to no urine and dry mouth or lips  · You have severe stomach pain  · You feel too weak or dizzy to stand up  · You see blood in your vomit or bowel movements  Contact your healthcare provider if:   · You cannot keep any food or liquid down  · You are losing weight  · You have a fever  · You have questions or concerns about your condition or care  Medicines:   · Medicines, vitamins, or supplements  may be given to help decrease nausea and vomiting  · Take your medicine as directed  Contact your healthcare provider if you think your medicine is not helping or if you have side effects  Tell him of her if you are allergic to any medicine  Keep a list of the medicines, vitamins, and herbs you take  Include the amounts, and when and why you take them  Bring the list or the pill bottles to follow-up visits  Carry your medicine list with you in case of an emergency  Manage your symptoms:   · Eat small amounts of food every 1 to 2 hours  Some examples of good foods to eat include broth, toast, crackers, fruit, eggs, gelatin, or cottage cheese  Do not eat spicy or high-fat foods  Foods and drinks with ginger, such as ginger ale, may help to decrease nausea and vomiting  · Drink liquids as directed  You may need to drink small amounts of liquid often to prevent dehydration  Ask how much liquid to drink each day and which liquids are best for you  · Rest when you need to    Start activity slowly and work up to your usual routine as you start to feel better  · Medicines, vitamins, or supplements  may be given to help decrease nausea and vomiting  · Weigh yourself daily if directed by your healthcare provider  You may need to keep a record of your daily weights for your healthcare provider  He may want to make sure you are not losing too much weight  Follow up with your healthcare provider as directed:  Write down your questions so you remember to ask them during your visits  © 2017 [employer] Information is for End User's use only and may not be sold, redistributed or otherwise used for commercial purposes  All illustrations and images included in CareNotes® are the copyrighted property of [employer]  or [employer]  The above information is an  only  It is not intended as medical advice for individual conditions or treatments  Talk to your doctor, nurse or pharmacist before following any medical regimen to see if it is safe and effective for you

## 2019-01-02 ENCOUNTER — HOSPITAL ENCOUNTER (EMERGENCY)
Facility: HOSPITAL | Age: 29
Discharge: HOME | End: 2019-01-02
Attending: EMERGENCY MEDICINE
Payer: COMMERCIAL

## 2019-01-02 VITALS
HEART RATE: 63 BPM | OXYGEN SATURATION: 100 % | TEMPERATURE: 97.6 F | DIASTOLIC BLOOD PRESSURE: 57 MMHG | RESPIRATION RATE: 16 BRPM | WEIGHT: 109.35 LBS | SYSTOLIC BLOOD PRESSURE: 101 MMHG | BODY MASS INDEX: 18.2 KG/M2

## 2019-01-02 DIAGNOSIS — R10.32 ABDOMINAL CRAMPING, BILATERAL LOWER QUADRANT: Primary | ICD-10-CM

## 2019-01-02 DIAGNOSIS — R10.31 ABDOMINAL CRAMPING, BILATERAL LOWER QUADRANT: Primary | ICD-10-CM

## 2019-01-02 LAB
ALBUMIN SERPL BCP-MCNC: 3.8 G/DL (ref 3.5–5)
ALP SERPL-CCNC: 49 U/L (ref 46–116)
ALT SERPL W P-5'-P-CCNC: 15 U/L (ref 12–78)
AMORPH URATE CRY URNS QL MICRO: ABNORMAL /HPF
ANION GAP SERPL CALCULATED.3IONS-SCNC: 10 MMOL/L (ref 4–13)
AST SERPL W P-5'-P-CCNC: 12 U/L (ref 5–45)
B-HCG SERPL-ACNC: ABNORMAL MIU/ML
BACTERIA UR QL AUTO: ABNORMAL /HPF
BASOPHILS # BLD AUTO: 0.04 THOUSANDS/ΜL (ref 0–0.1)
BASOPHILS NFR BLD AUTO: 1 % (ref 0–1)
BILIRUB SERPL-MCNC: 0.4 MG/DL (ref 0.2–1)
BILIRUB UR QL STRIP: NEGATIVE
BUN SERPL-MCNC: 9 MG/DL (ref 5–25)
CALCIUM SERPL-MCNC: 9.3 MG/DL (ref 8.3–10.1)
CHLORIDE SERPL-SCNC: 101 MMOL/L (ref 100–108)
CLARITY UR: CLEAR
CO2 SERPL-SCNC: 27 MMOL/L (ref 21–32)
COLOR UR: YELLOW
CREAT SERPL-MCNC: 0.63 MG/DL (ref 0.6–1.3)
EOSINOPHIL # BLD AUTO: 0.04 THOUSAND/ΜL (ref 0–0.61)
EOSINOPHIL NFR BLD AUTO: 1 % (ref 0–6)
ERYTHROCYTE [DISTWIDTH] IN BLOOD BY AUTOMATED COUNT: 12.5 % (ref 11.6–15.1)
GFR SERPL CREATININE-BSD FRML MDRD: 122 ML/MIN/1.73SQ M
GLUCOSE SERPL-MCNC: 70 MG/DL (ref 65–140)
GLUCOSE UR STRIP-MCNC: NEGATIVE MG/DL
HCT VFR BLD AUTO: 38.9 % (ref 34.8–46.1)
HGB BLD-MCNC: 13.6 G/DL (ref 11.5–15.4)
HGB UR QL STRIP.AUTO: NEGATIVE
IMM GRANULOCYTES # BLD AUTO: 0.02 THOUSAND/UL (ref 0–0.2)
IMM GRANULOCYTES NFR BLD AUTO: 0 % (ref 0–2)
KETONES UR STRIP-MCNC: NEGATIVE MG/DL
LEUKOCYTE ESTERASE UR QL STRIP: ABNORMAL
LYMPHOCYTES # BLD AUTO: 0.74 THOUSANDS/ΜL (ref 0.6–4.47)
LYMPHOCYTES NFR BLD AUTO: 11 % (ref 14–44)
MCH RBC QN AUTO: 30 PG (ref 26.8–34.3)
MCHC RBC AUTO-ENTMCNC: 35 G/DL (ref 31.4–37.4)
MCV RBC AUTO: 86 FL (ref 82–98)
MONOCYTES # BLD AUTO: 0.34 THOUSAND/ΜL (ref 0.17–1.22)
MONOCYTES NFR BLD AUTO: 5 % (ref 4–12)
NEUTROPHILS # BLD AUTO: 5.6 THOUSANDS/ΜL (ref 1.85–7.62)
NEUTS SEG NFR BLD AUTO: 82 % (ref 43–75)
NITRITE UR QL STRIP: NEGATIVE
NON-SQ EPI CELLS URNS QL MICRO: ABNORMAL /HPF
NRBC BLD AUTO-RTO: 0 /100 WBCS
PH UR STRIP.AUTO: 7 [PH] (ref 4.5–8)
PLATELET # BLD AUTO: 160 THOUSANDS/UL (ref 149–390)
PMV BLD AUTO: 9.3 FL (ref 8.9–12.7)
POTASSIUM SERPL-SCNC: 4.1 MMOL/L (ref 3.5–5.3)
PROT SERPL-MCNC: 7.2 G/DL (ref 6.4–8.2)
PROT UR STRIP-MCNC: NEGATIVE MG/DL
RBC # BLD AUTO: 4.53 MILLION/UL (ref 3.81–5.12)
RBC #/AREA URNS AUTO: ABNORMAL /HPF
SODIUM SERPL-SCNC: 138 MMOL/L (ref 136–145)
SP GR UR STRIP.AUTO: <=1.005 (ref 1–1.03)
UROBILINOGEN UR QL STRIP.AUTO: 0.2 E.U./DL
WBC # BLD AUTO: 6.78 THOUSAND/UL (ref 4.31–10.16)
WBC #/AREA URNS AUTO: ABNORMAL /HPF

## 2019-01-02 PROCEDURE — 84702 CHORIONIC GONADOTROPIN TEST: CPT | Performed by: NURSE PRACTITIONER

## 2019-01-02 PROCEDURE — 81001 URINALYSIS AUTO W/SCOPE: CPT | Performed by: NURSE PRACTITIONER

## 2019-01-02 PROCEDURE — 80053 COMPREHEN METABOLIC PANEL: CPT | Performed by: NURSE PRACTITIONER

## 2019-01-02 PROCEDURE — 99284 EMERGENCY DEPT VISIT MOD MDM: CPT

## 2019-01-02 PROCEDURE — 36415 COLL VENOUS BLD VENIPUNCTURE: CPT | Performed by: NURSE PRACTITIONER

## 2019-01-02 PROCEDURE — 87086 URINE CULTURE/COLONY COUNT: CPT | Performed by: NURSE PRACTITIONER

## 2019-01-02 PROCEDURE — 85025 COMPLETE CBC W/AUTO DIFF WBC: CPT | Performed by: NURSE PRACTITIONER

## 2019-01-02 NOTE — DISCHARGE INSTRUCTIONS
Abdominal Pain   AMBULATORY CARE:   Abdominal pain  can be dull, achy, or sharp  You may have pain in one area of your abdomen, or in your entire abdomen  Your pain may be caused by a condition such as constipation, food sensitivity or poisoning, infection, or a blockage  Abdominal pain can also be from a hernia, appendicitis, or an ulcer  Liver, gallbladder, or kidney conditions can also cause abdominal pain  The cause of your abdominal pain may be unknown  Seek care immediately if:   · You have new chest pain or shortness of breath  · You have pulsing pain in your upper abdomen or lower back that suddenly becomes constant  · Your pain is in the right lower abdominal area and worsens with movement  · You have a fever over 100 4°F (38°C) or shaking chills  · You are vomiting and cannot keep food or liquids down  · Your pain does not improve or gets worse over the next 8 to 12 hours  · You see blood in your vomit or bowel movements, or they look black and tarry  · Your skin or the whites of your eyes turn yellow  · You are a woman and have a large amount of vaginal bleeding that is not your monthly period  Contact your healthcare provider if:   · You have pain in your lower back  · You are a man and have pain in your testicles  · You have pain when you urinate  · You have questions or concerns about your condition or care  Treatment for abdominal pain  may include medicine to calm your stomach, prevent vomiting, or decrease pain  Follow up with your healthcare provider as directed:  Write down your questions so you remember to ask them during your visits  © 2017 [employer]  Information is for End User's use only and may not be sold, redistributed or otherwise used for commercial purposes  All illustrations and images included in CareNotes® are the copyrighted property of [employer]  or [employer]    The above information is an educational aid only  It is not intended as medical advice for individual conditions or treatments  Talk to your doctor, nurse or pharmacist before following any medical regimen to see if it is safe and effective for you

## 2019-01-02 NOTE — ED PROVIDER NOTES
History  Chief Complaint   Patient presents with    Abdominal Pain Pregnant     pt is 10-11 weeks pregnant and c/o left LQ pain for the past few days  states that it is similar to cysts that she has had in the past  also c/o mid-lower back pain  denies any vaginal bleeding or urinary symptoms     25-year-old female who presents here with a chief complaint of lower abdominal pain  She reports that she is approximately 11 weeks pregnant  She has had the pain for at least several days now  She is concern for urinary tract infection  She denies any nausea vomiting  She denies any urinary complaints no dysuria no hematuria  No diarrhea  Prior to Admission Medications   Prescriptions Last Dose Informant Patient Reported? Taking? Prenatal Multivit-Min-Fe-FA (PRE- PO)   Yes No   Sig: Take by mouth   ondansetron (ZOFRAN-ODT) 4 mg disintegrating tablet   No No   Sig: Take 1 tablet (4 mg total) by mouth every 6 (six) hours as needed for nausea or vomiting for up to 30 doses      Facility-Administered Medications: None       History reviewed  No pertinent past medical history  History reviewed  No pertinent surgical history  History reviewed  No pertinent family history  I have reviewed and agree with the history as documented  Social History   Substance Use Topics    Smoking status: Former Smoker    Smokeless tobacco: Never Used    Alcohol use No        Review of Systems   Constitutional: Negative for activity change, fatigue and fever  HENT: Negative for congestion, ear pain, rhinorrhea and sore throat  Eyes: Negative  Respiratory: Negative for cough, shortness of breath and wheezing  Gastrointestinal: Positive for abdominal pain (Mild lower abdominal)  Negative for diarrhea, nausea and vomiting  Endocrine: Negative      Genitourinary: Negative for difficulty urinating, dyspareunia, dysuria, flank pain, frequency, menstrual problem, pelvic pain, urgency, vaginal bleeding, vaginal discharge and vaginal pain  Musculoskeletal: Negative for arthralgias and myalgias  Skin: Negative for color change and pallor  Neurological: Negative for dizziness, speech difficulty, weakness and headaches  Hematological: Negative for adenopathy  Psychiatric/Behavioral: Negative for confusion  Physical Exam  Physical Exam   Constitutional: She is oriented to person, place, and time  She appears well-developed and well-nourished  She is cooperative  Non-toxic appearance  She does not have a sickly appearance  She does not appear ill  No distress  HENT:   Head: Normocephalic and atraumatic  Right Ear: Tympanic membrane and external ear normal    Left Ear: Tympanic membrane and external ear normal    Nose: No rhinorrhea, sinus tenderness or nasal deformity  No epistaxis  Right sinus exhibits no maxillary sinus tenderness and no frontal sinus tenderness  Left sinus exhibits no maxillary sinus tenderness and no frontal sinus tenderness  Mouth/Throat: Oropharynx is clear and moist and mucous membranes are normal  Normal dentition  Eyes: Pupils are equal, round, and reactive to light  EOM are normal    Neck: Normal range of motion  Neck supple  Cardiovascular: Normal rate, regular rhythm and normal heart sounds  No murmur heard  Pulmonary/Chest: Effort normal and breath sounds normal  No accessory muscle usage  No respiratory distress  She has no wheezes  She has no rales  She exhibits no tenderness  Abdominal: Soft  She exhibits no distension  There is no guarding  A nonfocal abdominal exam no tenderness no guarding no rebound no rigidity  Musculoskeletal: Normal range of motion  She exhibits no edema or tenderness  Lymphadenopathy:     She has no cervical adenopathy  Neurological: She is alert and oriented to person, place, and time  She exhibits normal muscle tone  Skin: Skin is warm and dry  No rash noted  No erythema  Psychiatric: She has a normal mood and affect  Nursing note and vitals reviewed  Vital Signs  ED Triage Vitals [01/02/19 0831]   Temperature Pulse Respirations Blood Pressure SpO2   97 6 °F (36 4 °C) 95 15 122/72 99 %      Temp Source Heart Rate Source Patient Position - Orthostatic VS BP Location FiO2 (%)   Oral Monitor Sitting Right arm --      Pain Score       6           Vitals:    01/02/19 0831 01/02/19 1032   BP: 122/72 101/57   Pulse: 95 63   Patient Position - Orthostatic VS: Sitting Sitting       Visual Acuity      ED Medications  Medications - No data to display    Diagnostic Studies  Results Reviewed     Procedure Component Value Units Date/Time    Urine Microscopic [[id number]]  (Abnormal) Collected:  01/02/19 1000    Lab Status:  Final result Specimen:  Urine from Urine, Other Updated:  01/02/19 1032     RBC, UA 0-1 (A) /hpf      WBC, UA 2-4 (A) /hpf      Epithelial Cells Moderate (A) /hpf      Bacteria, UA None Seen /hpf      AMORPH URATES Moderate /hpf      URINE COMMENT --    UA w Reflex to Microscopic w Reflex to Culture [[id number]]  (Abnormal) Collected:  01/02/19 1000    Lab Status:  Final result Specimen:  Urine from Urine, Other Updated:  01/02/19 1016     Color, UA Yellow     Clarity, UA Clear     Specific Gravity, UA <=1 005     pH, UA 7 0     Leukocytes, UA Large (A)     Nitrite, UA Negative     Protein, UA Negative mg/dl      Glucose, UA Negative mg/dl      Ketones, UA Negative mg/dl      Urobilinogen, UA 0 2 E U /dl      Bilirubin, UA Negative     Blood, UA Negative     URINE COMMENT --    Urine culture [[id number]] Collected:  01/02/19 1000    Lab Status:   In process Specimen:  Urine from Urine, Other Updated:  01/02/19 1016    hCG, quantitative [[id number]]  (Abnormal) Collected:  01/02/19 0903    Lab Status:  Final result Specimen:  Blood from Arm, Right Updated:  01/02/19 0955     HCG, Quant 868,441 (H) mIU/mL     Narrative:          Expected Ranges:     Approximate               Approximate HCG  Gestation age Concentration ( mIU/mL)  _____________          ______________________   Guzmán Cadena                      HCG values  0 2-1                       5-50  1-2                           2-3                         100-5000  3-4                         500-11663  4-5                         1000-50238  5-6                         [id number]  6-8                         [id number]  8-12                        [id number]    Comprehensive metabolic panel [[id number]] Collected:  01/02/19 0903    Lab Status:  Final result Specimen:  Blood from Arm, Right Updated:  01/02/19 9722     Sodium 138 mmol/L      Potassium 4 1 mmol/L      Chloride 101 mmol/L      CO2 27 mmol/L      ANION GAP 10 mmol/L      BUN 9 mg/dL      Creatinine 0 63 mg/dL      Glucose 70 mg/dL      Calcium 9 3 mg/dL      AST 12 U/L      ALT 15 U/L      Alkaline Phosphatase 49 U/L      Total Protein 7 2 g/dL      Albumin 3 8 g/dL      Total Bilirubin 0 40 mg/dL      eGFR 122 ml/min/1 73sq m     Narrative:         National Kidney Disease Education Program recommendations are as follows:  GFR calculation is accurate only with a steady state creatinine  Chronic Kidney disease less than 60 ml/min/1 73 sq  meters  Kidney failure less than 15 ml/min/1 73 sq  meters      CBC and differential [[id number]]  (Abnormal) Collected:  01/02/19 0903    Lab Status:  Final result Specimen:  Blood from Arm, Right Updated:  01/02/19 0908     WBC 6 78 Thousand/uL      RBC 4 53 Million/uL      Hemoglobin 13 6 g/dL      Hematocrit 38 9 %      MCV 86 fL      MCH 30 0 pg      MCHC 35 0 g/dL      RDW 12 5 %      MPV 9 3 fL      Platelets 093 Thousands/uL      nRBC 0 /100 WBCs      Neutrophils Relative 82 (H) %      Immat GRANS % 0 %      Lymphocytes Relative 11 (L) %      Monocytes Relative 5 %      Eosinophils Relative 1 %      Basophils Relative 1 %      Neutrophils Absolute 5 60 Thousands/µL      Immature Grans Absolute 0 02 Thousand/uL      Lymphocytes Absolute 0 74 Thousands/µL Monocytes Absolute 0 34 Thousand/µL      Eosinophils Absolute 0 04 Thousand/µL      Basophils Absolute 0 04 Thousands/µL                  No orders to display              Procedures  Pelvic Ultrasound  Performed by: Patrick Jean-Baptiste by: Padmini Lujan     Procedure date/time:  1/2/2019 8:54 AM  Patient location:  ED and bedside  Procedure details:     Indications: evaluate for IUP      Assess:  EGA and intrauterine pregnancy    Technique:  Transabdominal obstetric (limited) exam  Uterine findings:     Intrauterine pregnancy: identified      Fetal heart rate: identified      Fetal heart rate (bpm):  174    Crown-rump length (mm):  48 (11w4d)  Other findings:     Free pelvic fluid: not identified      Free peritoneal fluid: not identified             Phone Contacts  ED Phone Contact    ED Course                               MDM  Number of Diagnoses or Management Options  Abdominal cramping, bilateral lower quadrant: new and requires workup  Diagnosis management comments: Bedside ultrasound reveals positive intrauterine pregnancy with fetal heart rate identified  No suspicious fluid collections at unremarkable laboratory testing  Urinalysis is unremarkable no bacteria seen minimal whites         Amount and/or Complexity of Data Reviewed  Clinical lab tests: reviewed and ordered  Tests in the radiology section of CPT®: reviewed  Review and summarize past medical records: yes  Independent visualization of images, tracings, or specimens: yes    Patient Progress  Patient progress: stable    CritCare Time    Disposition  Final diagnoses:   Abdominal cramping, bilateral lower quadrant     Time reflects when diagnosis was documented in both MDM as applicable and the Disposition within this note     Time User Action Codes Description Comment    1/2/2019 10:35 AM Lona Ospina Add [R10 31,  R10 32] Abdominal cramping, bilateral lower quadrant       ED Disposition     ED Disposition Condition Comment    Discharge Heather Sherman discharge to home/self care  Condition at discharge: Stable        Follow-up Information     Follow up With Specialties Details Why Contact Info    Marcelo Alex MD Obstetrics and Gynecology, Obstetrics, Gynecology Schedule an appointment as soon as possible for a visit For Continued Evaluation [address]  [place] [address] [facility]  [phone]            Discharge Medication List as of 2019 10:37 AM      CONTINUE these medications which have NOT CHANGED    Details   ondansetron (ZOFRAN-ODT) 4 mg disintegrating tablet Take 1 tablet (4 mg total) by mouth every 6 (six) hours as needed for nausea or vomiting for up to 30 doses, Starting 2018, Print      Prenatal Multivit-Min-Fe-FA (PRE-PENNY PO) Take by mouth, Historical Med           No discharge procedures on file      ED Provider  Electronically Signed by           SHANNAN Rhodes  [phone]

## 2019-01-03 LAB — BACTERIA UR CULT: NORMAL

## 2020-02-10 ENCOUNTER — APPOINTMENT (EMERGENCY)
Dept: CT IMAGING | Facility: HOSPITAL | Age: 30
End: 2020-02-10
Payer: COMMERCIAL

## 2020-02-10 ENCOUNTER — HOSPITAL ENCOUNTER (EMERGENCY)
Facility: HOSPITAL | Age: 30
Discharge: HOME | End: 2020-02-10
Attending: EMERGENCY MEDICINE
Payer: COMMERCIAL

## 2020-02-10 VITALS
WEIGHT: 120.37 LBS | OXYGEN SATURATION: 100 % | HEART RATE: 70 BPM | SYSTOLIC BLOOD PRESSURE: 111 MMHG | RESPIRATION RATE: 18 BRPM | DIASTOLIC BLOOD PRESSURE: 57 MMHG | TEMPERATURE: 98.1 F | BODY MASS INDEX: 20.03 KG/M2

## 2020-02-10 DIAGNOSIS — R10.9 RIGHT FLANK PAIN: ICD-10-CM

## 2020-02-10 DIAGNOSIS — N39.0 UTI (URINARY TRACT INFECTION): Primary | ICD-10-CM

## 2020-02-10 LAB
ALBUMIN SERPL BCP-MCNC: 3.8 G/DL (ref 3.5–5)
ALP SERPL-CCNC: 69 U/L (ref 46–116)
ALT SERPL W P-5'-P-CCNC: 16 U/L (ref 12–78)
ANION GAP SERPL CALCULATED.3IONS-SCNC: 7 MMOL/L (ref 4–13)
AST SERPL W P-5'-P-CCNC: 18 U/L (ref 5–45)
BASOPHILS # BLD AUTO: 0.03 THOUSANDS/ΜL (ref 0–0.1)
BASOPHILS NFR BLD AUTO: 1 % (ref 0–1)
BILIRUB SERPL-MCNC: 0.3 MG/DL (ref 0.2–1)
BILIRUB UR QL STRIP: NEGATIVE
BUN SERPL-MCNC: 14 MG/DL (ref 5–25)
CALCIUM SERPL-MCNC: 8.5 MG/DL (ref 8.3–10.1)
CHLORIDE SERPL-SCNC: 105 MMOL/L (ref 100–108)
CLARITY UR: CLEAR
CO2 SERPL-SCNC: 29 MMOL/L (ref 21–32)
COLOR UR: YELLOW
CREAT SERPL-MCNC: 0.83 MG/DL (ref 0.6–1.3)
EOSINOPHIL # BLD AUTO: 0.07 THOUSAND/ΜL (ref 0–0.61)
EOSINOPHIL NFR BLD AUTO: 2 % (ref 0–6)
ERYTHROCYTE [DISTWIDTH] IN BLOOD BY AUTOMATED COUNT: 12.3 % (ref 11.6–15.1)
EXT PREG TEST URINE: NEGATIVE
EXT. CONTROL ED NAV: NORMAL
GFR SERPL CREATININE-BSD FRML MDRD: 96 ML/MIN/1.73SQ M
GLUCOSE SERPL-MCNC: 99 MG/DL (ref 65–140)
GLUCOSE UR STRIP-MCNC: NEGATIVE MG/DL
HCG SERPL QL: NEGATIVE
HCT VFR BLD AUTO: 41.4 % (ref 34.8–46.1)
HGB BLD-MCNC: 13.8 G/DL (ref 11.5–15.4)
HGB UR QL STRIP.AUTO: NEGATIVE
IMM GRANULOCYTES # BLD AUTO: 0.01 THOUSAND/UL (ref 0–0.2)
IMM GRANULOCYTES NFR BLD AUTO: 0 % (ref 0–2)
KETONES UR STRIP-MCNC: NEGATIVE MG/DL
LEUKOCYTE ESTERASE UR QL STRIP: NEGATIVE
LYMPHOCYTES # BLD AUTO: 1.04 THOUSANDS/ΜL (ref 0.6–4.47)
LYMPHOCYTES NFR BLD AUTO: 24 % (ref 14–44)
MCH RBC QN AUTO: 28.6 PG (ref 26.8–34.3)
MCHC RBC AUTO-ENTMCNC: 33.3 G/DL (ref 31.4–37.4)
MCV RBC AUTO: 86 FL (ref 82–98)
MONOCYTES # BLD AUTO: 0.29 THOUSAND/ΜL (ref 0.17–1.22)
MONOCYTES NFR BLD AUTO: 7 % (ref 4–12)
NEUTROPHILS # BLD AUTO: 2.98 THOUSANDS/ΜL (ref 1.85–7.62)
NEUTS SEG NFR BLD AUTO: 66 % (ref 43–75)
NITRITE UR QL STRIP: NEGATIVE
NRBC BLD AUTO-RTO: 0 /100 WBCS
PH UR STRIP.AUTO: 7.5 [PH]
PLATELET # BLD AUTO: 150 THOUSANDS/UL (ref 149–390)
PMV BLD AUTO: 9.8 FL (ref 8.9–12.7)
POTASSIUM SERPL-SCNC: 3.7 MMOL/L (ref 3.5–5.3)
PROT SERPL-MCNC: 6.7 G/DL (ref 6.4–8.2)
PROT UR STRIP-MCNC: NEGATIVE MG/DL
RBC # BLD AUTO: 4.83 MILLION/UL (ref 3.81–5.12)
SODIUM SERPL-SCNC: 141 MMOL/L (ref 136–145)
SP GR UR STRIP.AUTO: 1.01 (ref 1–1.03)
UROBILINOGEN UR QL STRIP.AUTO: 0.2 E.U./DL
WBC # BLD AUTO: 4.42 THOUSAND/UL (ref 4.31–10.16)

## 2020-02-10 PROCEDURE — 99284 EMERGENCY DEPT VISIT MOD MDM: CPT

## 2020-02-10 PROCEDURE — 99285 EMERGENCY DEPT VISIT HI MDM: CPT | Performed by: EMERGENCY MEDICINE

## 2020-02-10 PROCEDURE — 81025 URINE PREGNANCY TEST: CPT | Performed by: EMERGENCY MEDICINE

## 2020-02-10 PROCEDURE — 81003 URINALYSIS AUTO W/O SCOPE: CPT | Performed by: EMERGENCY MEDICINE

## 2020-02-10 PROCEDURE — 85025 COMPLETE CBC W/AUTO DIFF WBC: CPT | Performed by: EMERGENCY MEDICINE

## 2020-02-10 PROCEDURE — 74176 CT ABD & PELVIS W/O CONTRAST: CPT

## 2020-02-10 PROCEDURE — 84703 CHORIONIC GONADOTROPIN ASSAY: CPT | Performed by: EMERGENCY MEDICINE

## 2020-02-10 PROCEDURE — 87591 N.GONORRHOEAE DNA AMP PROB: CPT | Performed by: EMERGENCY MEDICINE

## 2020-02-10 PROCEDURE — 80053 COMPREHEN METABOLIC PANEL: CPT | Performed by: EMERGENCY MEDICINE

## 2020-02-10 PROCEDURE — 36415 COLL VENOUS BLD VENIPUNCTURE: CPT | Performed by: EMERGENCY MEDICINE

## 2020-02-10 PROCEDURE — 87491 CHLMYD TRACH DNA AMP PROBE: CPT | Performed by: EMERGENCY MEDICINE

## 2020-02-10 PROCEDURE — 87086 URINE CULTURE/COLONY COUNT: CPT | Performed by: EMERGENCY MEDICINE

## 2020-02-10 RX ORDER — PHENAZOPYRIDINE HYDROCHLORIDE 200 MG/1
200 TABLET, FILM COATED ORAL 3 TIMES DAILY
Qty: 6 TABLET | Refills: 0 | Status: SHIPPED | OUTPATIENT
Start: 2020-02-10 | End: 2022-06-20

## 2020-02-10 RX ORDER — PHENAZOPYRIDINE HYDROCHLORIDE 100 MG/1
100 TABLET, FILM COATED ORAL ONCE
Status: COMPLETED | OUTPATIENT
Start: 2020-02-10 | End: 2020-02-10

## 2020-02-10 RX ORDER — CEPHALEXIN 250 MG/1
500 CAPSULE ORAL ONCE
Status: COMPLETED | OUTPATIENT
Start: 2020-02-10 | End: 2020-02-10

## 2020-02-10 RX ORDER — CEPHALEXIN 500 MG/1
500 CAPSULE ORAL 4 TIMES DAILY
Qty: 40 CAPSULE | Refills: 0 | Status: SHIPPED | OUTPATIENT
Start: 2020-02-10 | End: 2020-02-20

## 2020-02-10 RX ADMIN — CEPHALEXIN 500 MG: 250 CAPSULE ORAL at 21:02

## 2020-02-10 RX ADMIN — PHENAZOPYRIDINE HYDROCHLORIDE 100 MG: 100 TABLET ORAL at 21:02

## 2020-02-11 LAB
BACTERIA UR CULT: NORMAL
C TRACH DNA SPEC QL NAA+PROBE: NEGATIVE
N GONORRHOEA DNA SPEC QL NAA+PROBE: NEGATIVE

## 2020-02-11 NOTE — ED PROVIDER NOTES
History  Chief Complaint   Patient presents with    Flank Pain     pt c/o uti symptoms and right sided pain  33 y/o female presents to the ED for UTI symptoms and right flank pain  Patient states that she that she developed dysuria last week  States that it resolved and then today developed worsening dysuria, urinary urgency, and right flank pain  States that she has a hx of UTI in the past and symptoms feel similar  She denies any f/c, cp, sob, abd pain, vaginal bleeding/ discharge, hematuria, or d/c  Has not tried anything for the symptoms  No recent antibiotics  Flank Pain   Pain location:  R flank  Pain quality: aching    Pain radiates to:  Does not radiate  Pain severity:  Moderate  Onset quality:  Sudden  Duration:  1 day  Timing:  Constant  Progression:  Worsening  Chronicity:  New  Context: not previous surgeries and not sick contacts    Relieved by:  None tried  Worsened by:  Nothing  Ineffective treatments:  None tried  Associated symptoms: dysuria and nausea    Associated symptoms: no chest pain, no chills, no constipation, no cough, no diarrhea, no fever, no hematuria, no shortness of breath, no sore throat, no vaginal bleeding, no vaginal discharge and no vomiting        Prior to Admission Medications   Prescriptions Last Dose Informant Patient Reported? Taking? Prenatal Multivit-Min-Fe-FA (PRE- PO)   Yes Yes   Sig: Take by mouth      Facility-Administered Medications: None       History reviewed  No pertinent past medical history  History reviewed  No pertinent surgical history  History reviewed  No pertinent family history  I have reviewed and agree with the history as documented  Social History     Tobacco Use    Smoking status: Former Smoker    Smokeless tobacco: Never Used   Substance Use Topics    Alcohol use: No    Drug use: No        Review of Systems   Constitutional: Negative for chills and fever  HENT: Negative for congestion, ear pain and sore throat  Eyes: Negative for pain and visual disturbance  Respiratory: Negative for cough, shortness of breath and wheezing  Cardiovascular: Negative for chest pain and leg swelling  Gastrointestinal: Positive for nausea  Negative for abdominal pain, constipation, diarrhea and vomiting  Genitourinary: Positive for dysuria and flank pain  Negative for frequency, hematuria, urgency, vaginal bleeding and vaginal discharge  Musculoskeletal: Negative for neck pain and neck stiffness  Skin: Negative for rash and wound  Neurological: Negative for weakness, numbness and headaches  Psychiatric/Behavioral: Negative for agitation and confusion  All other systems reviewed and are negative  Physical Exam  Physical Exam   Constitutional: She is oriented to person, place, and time  She appears well-developed and well-nourished  HENT:   Head: Normocephalic and atraumatic  Mouth/Throat: Oropharynx is clear and moist    Eyes: Pupils are equal, round, and reactive to light  EOM are normal    Neck: Normal range of motion  Neck supple  Cardiovascular: Normal rate and regular rhythm  Pulmonary/Chest: Effort normal and breath sounds normal    Abdominal: Soft  Bowel sounds are normal  She exhibits no distension  There is no tenderness  +right cva tenderness    Musculoskeletal: Normal range of motion  Neurological: She is alert and oriented to person, place, and time  No focal deficits   Skin: Skin is warm and dry  Nursing note and vitals reviewed        Vital Signs  ED Triage Vitals [02/10/20 1746]   Temperature Pulse Respirations Blood Pressure SpO2   98 1 °F (36 7 °C) 65 18 108/65 99 %      Temp Source Heart Rate Source Patient Position - Orthostatic VS BP Location FiO2 (%)   Oral Monitor Sitting Left arm --      Pain Score       6           Vitals:    02/10/20 1746 02/10/20 2104   BP: 108/65 111/57   Pulse: 65 70   Patient Position - Orthostatic VS: Sitting Sitting         Visual Acuity      ED Medications  Medications   cephalexin (KEFLEX) capsule 500 mg (500 mg Oral Given 2/10/20 2102)   phenazopyridine (PYRIDIUM) tablet 100 mg (100 mg Oral Given 2/10/20 2102)       Diagnostic Studies  Results Reviewed     Procedure Component Value Units Date/Time    hCG, qualitative pregnancy [[id number]]  (Normal) Collected:  02/10/20 1851    Lab Status:  Final result Specimen:  Blood from Arm, Right Updated:  02/10/20 1920     Preg, Serum Negative    Comprehensive metabolic panel [[id number]] Collected:  02/10/20 1851    Lab Status:  Final result Specimen:  Blood from Arm, Right Updated:  02/10/20 1913     Sodium 141 mmol/L      Potassium 3 7 mmol/L      Chloride 105 mmol/L      CO2 29 mmol/L      ANION GAP 7 mmol/L      BUN 14 mg/dL      Creatinine 0 83 mg/dL      Glucose 99 mg/dL      Calcium 8 5 mg/dL      AST 18 U/L      ALT 16 U/L      Alkaline Phosphatase 69 U/L      Total Protein 6 7 g/dL      Albumin 3 8 g/dL      Total Bilirubin 0 30 mg/dL      eGFR 96 ml/min/1 73sq m     Narrative:       [facility] guidelines for Chronic Kidney Disease (CKD):     Stage 1 with normal or high GFR (GFR > 90 mL/min/1 73 square meters)    Stage 2 Mild CKD (GFR = 60-89 mL/min/1 73 square meters)    Stage 3A Moderate CKD (GFR = 45-59 mL/min/1 73 square meters)    Stage 3B Moderate CKD (GFR = 30-44 mL/min/1 73 square meters)    Stage 4 Severe CKD (GFR = 15-29 mL/min/1 73 square meters)    Stage 5 End Stage CKD (GFR <15 mL/min/1 73 square meters)  Note: GFR calculation is accurate only with a steady state creatinine    CBC and differential [[id number]] Collected:  02/10/20 1851    Lab Status:  Final result Specimen:  Blood from Arm, Right Updated:  02/10/20 1858     WBC 4 42 Thousand/uL      RBC 4 83 Million/uL      Hemoglobin 13 8 g/dL      Hematocrit 41 4 %      MCV 86 fL      MCH 28 6 pg      MCHC 33 3 g/dL      RDW 12 3 %      MPV 9 8 fL      Platelets 514 Thousands/uL      nRBC 0 /100 WBCs Neutrophils Relative 66 %      Immat GRANS % 0 %      Lymphocytes Relative 24 %      Monocytes Relative 7 %      Eosinophils Relative 2 %      Basophils Relative 1 %      Neutrophils Absolute 2 98 Thousands/µL      Immature Grans Absolute 0 01 Thousand/uL      Lymphocytes Absolute 1 04 Thousands/µL      Monocytes Absolute 0 29 Thousand/µL      Eosinophils Absolute 0 07 Thousand/µL      Basophils Absolute 0 03 Thousands/µL     Chlamydia/GC amplified DNA by PCR [[id number]] Collected:  02/10/20 1855    Lab Status: In process Updated:  02/10/20 1855    UA w Reflex to Microscopic w Reflex to Culture [[id number]] Collected:  02/10/20 1824    Lab Status:  Final result Specimen:  Urine, Clean Catch Updated:  02/10/20 1835     Color, UA Yellow     Clarity, UA Clear     Specific Gravity, UA 1 010     pH, UA 7 5     Leukocytes, UA Negative     Nitrite, UA Negative     Protein, UA Negative mg/dl      Glucose, UA Negative mg/dl      Ketones, UA Negative mg/dl      Urobilinogen, UA 0 2 E U /dl      Bilirubin, UA Negative     Blood, UA Negative     URINE COMMENT --    Urine culture [[id number]] Collected:  02/10/20 1824    Lab Status: In process Specimen:  Urine, Clean Catch Updated:  02/10/20 1835    POCT pregnancy, urine [[id number]]  (Normal) Resulted:  02/10/20 1829    Lab Status:  Final result Updated:  02/10/20 1829     EXT PREG TEST UR (Ref: Negative) negative     Control valid                 CT renal stone study abdomen pelvis wo contrast   ED Interpretation by Bryce Yu DO (02/10 2045)   No urinary calculi  No obstructive uropathy      Final Result by Stephen Moseley MD (02/10 2032)      No urinary calculi  No obstructive uropathy              Workstation performed: [id number]                    Procedures  Procedures         ED Course  ED Course as of Feb 10 2125   Mon Feb 10, 2020   1925 PREGNANCY, SERUM: Negative                               MDM  Number of Diagnoses or Management Options  Right flank pain: new and requires workup  UTI (urinary tract infection): new and requires workup  Diagnosis management comments: Patient with UTI symptoms and right flank pain- will get labs, UA, and urine preg  Will also get CT renal stone to r/o kidney stone and hydro  Patient reevaluated and feels improved  Patient updated on results of tests  Discharge instructions given including medications, follow-up, and return precautions  Patient demonstrates verbal understanding and agrees with plan  Amount and/or Complexity of Data Reviewed  Clinical lab tests: ordered and reviewed  Tests in the radiology section of CPT®: ordered and reviewed  Tests in the medicine section of CPT®: ordered and reviewed  Discussion of test results with the performing providers: yes  Decide to obtain previous medical records or to obtain history from someone other than the patient: yes  Obtain history from someone other than the patient: yes  Review and summarize past medical records: yes  Discuss the patient with other providers: yes  Independent visualization of images, tracings, or specimens: yes    Patient Progress  Patient progress: improved        Disposition  Final diagnoses:   UTI (urinary tract infection)   Right flank pain     Time reflects when diagnosis was documented in both MDM as applicable and the Disposition within this note     Time User Action Codes Description Comment    2/10/2020  9:02 PM Arabella Chan Add [N39 0] UTI (urinary tract infection)     2/10/2020  9:02 PM Arabella Chan Add [R10 9] Right flank pain       ED Disposition     ED Disposition Condition Date/Time Comment    Discharge Stable Mon Feb 10, 2020  9:02 PM Janina Gonzalez discharge to home/self care              Follow-up Information     Follow up With Specialties Details Why Contact Info Additional [facility] [address] [place] [facility] Call in 1 day for follow up as [address] [place] [phone]  701  [facility], [address], [address] [place], [place], [address]    [address]  Call  to establish a family doctor if you do not already have one [address] [facility] Emergency Department Emergency Medicine Go to  immediately for any new or worsening symptoms  [address] [facility] [id number]  [address] [facility] ED, [address] [facility], [place], [place], [address]          Discharge Medication List as of 2/10/2020  9:08 PM      START taking these medications    Details   cephalexin (KEFLEX) 500 mg capsule Take 1 capsule (500 mg total) by mouth 4 (four) times a day for 10 days, Starting Mon 2/10/2020, Until Thu 2020, Print      phenazopyridine (PYRIDIUM) 200 mg tablet Take 1 tablet (200 mg total) by mouth 3 (three) times a day, Starting Mon 2/10/2020, Print         CONTINUE these medications which have NOT CHANGED    Details   Prenatal Multivit-Min-Fe-FA (PRE-PENNY PO) Take by mouth, Historical Med           No discharge procedures on file      ED Provider  Electronically Signed by           Jessee Albrecht DO  02/10/20 2285

## 2022-05-18 ENCOUNTER — TELEPHONE (OUTPATIENT)
Dept: HEMATOLOGY ONCOLOGY | Facility: CLINIC | Age: 32
End: 2022-05-18

## 2022-05-18 NOTE — TELEPHONE ENCOUNTER
New Patient Intake Form   Patient Details:    Hetal Mcelroy  1990    Appointment Information   Who is calling to schedule? patient   If not self, what is the caller's name? Please put name of RBC nurse as well  n/a   DID YOU CONFIRM INSURANCE WITH PATIENT? yes   Referring provider self   What is the diagnosis? Abnormal labs      Is there a confirmed tissue diagnosis?   no   Is there a biopsy ordered or pending? Please specify dates   no     Is patient aware of diagnosis? yes   Have you had any imaging or labs done? If yes, where? (If imaging done outside of [facility], please remind patient to bring a disk ) Yes labs 5/10     If imaging done at outside facility, did you instruct patient to obtain discs and bring to visit? no   Have you been seen by another Oncologist/Hematologist?  If so, who and where? no   Are the records in [facility] or Care Everywhere? no   Does the patient have records at another facility/hospital? Yes at [facility]   If yes, Name of facility, city and state where facility is located  Did you [facility] and [facility]? Yes patient will have them fax her labs to [phone]  [facility]  [address], [address]   Preferred [place]   Is the patient willing to be seen by another provider? (This is for breast patients only) N/A     Did you send new patient paperwork?   Email or mail? yes   Miscellaneous Information: appt on 5/31 @ 320 with Dr Noelle Hatch  Patient offered closer location, patient wanted to be seen sooner and willing to go to [facility]

## 2022-05-26 ENCOUNTER — TELEPHONE (OUTPATIENT)
Dept: HEMATOLOGY ONCOLOGY | Facility: CLINIC | Age: 32
End: 2022-05-26

## 2022-05-26 NOTE — TELEPHONE ENCOUNTER
5/26/22 Spoke with pt  Regarding labs, she will fax them over to us prior to upcoming appt    Lobo Cruz

## 2022-05-27 ENCOUNTER — TELEPHONE (OUTPATIENT)
Dept: SURGICAL ONCOLOGY | Facility: CLINIC | Age: 32
End: 2022-05-27

## 2022-06-03 ENCOUNTER — TELEPHONE (OUTPATIENT)
Dept: HEMATOLOGY ONCOLOGY | Facility: CLINIC | Age: 32
End: 2022-06-03

## 2022-06-03 NOTE — TELEPHONE ENCOUNTER
New Patient Intake Form   Patient Details:    Hetal Mcelroy  1990    Appointment Information   Who is calling to schedule? Patient   If not self, what is the caller's name? Please put name of RBC nurse as well  self   DID YOU CONFIRM INSURANCE WITH PATIENT? yes   Referring provider Dr Mary Randolph   What is the diagnosis? Blood clotting     Is there a confirmed tissue diagnosis? No   Is there a biopsy ordered or pending? Please specify dates   No     Is patient aware of diagnosis? Yes   Have you had any imaging or labs done? If yes, where? (If imaging done outside of [facility], please remind patient to bring a disk ) Yes [facility]     If imaging done at outside facility, did you instruct patient to obtain discs and bring to visit? No, all [facility]   Have you been seen by another Oncologist/Hematologist?  If so, who and where? No   Are the records in [facility] or Care Everywhere? Yes   Does the patient have records at another facility/hospital? No all [facility]   If yes, Name of facility, city and state where facility is located  Did you instruct patient to have records faxed to [facility] and provide rightx number? N/A   Preferred [facility]   Is the patient willing to be seen by another provider? (This is for breast patients only) N/A     Did you send new patient paperwork? Email or mail?  Not required   Miscellaneous Information: Scheduled appmt 6/20 3:20 Dr Kathleen Buckley at [facility]

## 2022-06-06 ENCOUNTER — TELEPHONE (OUTPATIENT)
Dept: HEMATOLOGY ONCOLOGY | Facility: CLINIC | Age: 32
End: 2022-06-06

## 2022-06-06 NOTE — TELEPHONE ENCOUNTER
Patient is calling to see if we received a fax from [facility] fertility  I informed patient I did not see anything from them in her chart as of now  I also confirmed our fax number with the patient

## 2022-06-15 ENCOUNTER — TELEPHONE (OUTPATIENT)
Dept: HEMATOLOGY ONCOLOGY | Facility: CLINIC | Age: 32
End: 2022-06-15

## 2022-06-20 ENCOUNTER — CONSULT (OUTPATIENT)
Dept: HEMATOLOGY ONCOLOGY | Facility: CLINIC | Age: 32
End: 2022-06-20
Payer: COMMERCIAL

## 2022-06-20 ENCOUNTER — TELEPHONE (OUTPATIENT)
Dept: HEMATOLOGY ONCOLOGY | Facility: CLINIC | Age: 32
End: 2022-06-20

## 2022-06-20 VITALS
TEMPERATURE: 97.9 F | HEART RATE: 78 BPM | OXYGEN SATURATION: 97 % | SYSTOLIC BLOOD PRESSURE: 114 MMHG | WEIGHT: 108 LBS | DIASTOLIC BLOOD PRESSURE: 60 MMHG | BODY MASS INDEX: 17.99 KG/M2 | HEIGHT: 65 IN | RESPIRATION RATE: 16 BRPM

## 2022-06-20 DIAGNOSIS — R76.0 POSITIVE CARDIOLIPIN ANTIBODIES: Primary | ICD-10-CM

## 2022-06-20 DIAGNOSIS — Z87.59 HISTORY OF MISCARRIAGE: ICD-10-CM

## 2022-06-20 PROCEDURE — 99244 OFF/OP CNSLTJ NEW/EST MOD 40: CPT | Performed by: INTERNAL MEDICINE

## 2022-06-20 RX ORDER — CHOLECALCIFEROL (VITAMIN D3) 125 MCG
2000 CAPSULE ORAL DAILY
COMMUNITY

## 2022-06-20 NOTE — PROGRESS NOTES
[facility] - Hematology & Medical Oncology  Outpatient Visit Encounter Note      Tommy Warren 32 y o  female [id number] [id number] Date:  6/21/2022    HEMATOLOGICAL HISTORY        Clotting History Denies   Bleeding History Denies   Cancer History Denies   Family Cancer History MGM - Bone   MGF - Colon  PGM - Colon   H/O Blood/Plt Transfusion Denies   Tobacco Use Yes, working to quit   H/O COVID19 Infection Denies   COVID19 Vaccine Status Not         SUBJECTIVE      Tommy Warren is a 32 y o  here for new consultation with me today  The patient is referred by fertility specialist and the reason for consultation is concern for potential APLS  In review of the chart and talking with the patient, she says that she has a history of 2 miscarriages as detailed below she is getting workup and management with the fertility physician and upon diagnostic workup there is concerns being raised about a possible antiphospholipid syndrome diagnosis  Labs from [facility] 5/10/22 that patient showed on the phone:    aCL IgM 112 8  acL IgG and IgA were normal  LAC not detected    She has two miscarriages in her lifetime, the first in April 2020 and May 2022 being the second  They were both in first trimester  She has delivered two babies, both were delivered in their third trimster at full or 37 weeks  Denies VTE history  Denies any fevers chills nausea vomiting chest or abdominal pain  Denies any personal history of autoimmune disease but says that there is possible concern about a family history of lupus  I have reviewed the relevant past medical, surgical, social and family history  I have also reviewed allergies and medications for this patient  Review of Systems  Review of Systems   All other systems reviewed and are negative          OBJECTIVE     Physical Exam  Vitals:    06/20/22 1534   BP: 114/60   BP Location: Left arm   Patient Position: Sitting   Cuff Size: Adult   Pulse: 78   Resp: 16   Temp: 97 9 °F (36 6 °C)   TempSrc: Temporal   SpO2: 97%   Weight: 49 kg (108 lb)   Height: 5' 5" (1 651 m)       Physical Exam  Vitals reviewed  Constitutional:       General: She is not in acute distress  Appearance: She is not ill-appearing, toxic-appearing or diaphoretic  HENT:      Head: Normocephalic and atraumatic  Eyes:      General: No scleral icterus  Extraocular Movements: Extraocular movements intact  Cardiovascular:      Rate and Rhythm: Normal rate  Pulmonary:      Effort: Pulmonary effort is normal  No respiratory distress  Abdominal:      General: There is no distension  Musculoskeletal:         General: Normal range of motion  Cervical back: Normal range of motion and neck supple  Neurological:      General: No focal deficit present  Mental Status: She is alert and oriented to person, place, and time  Gait: Gait normal           Imaging  Relevant imaging reviewed in chart    Labs  Relevant labs reviewed in chart   ASSESSMENT & PLAN      Diagnosis ICD-10-CM Associated Orders   1  Positive cardiolipin antibodies  R76 0 Cardiolipin antibody   2  History of miscarriage  Z87 59 Cardiolipin antibody         32 y o  female with a history of 2 spontaneous first-trimester miscarriages and positive IgM anticardiolipin antibody seen in consultation for concern for possible APLS diagnosis  · Discussion/Plan/Labs  · At this time, there is strong clinical suspicion for APLS  However, while the patient meets the pregnancy morbidity criteria, the lab criteria has not been met yet and hence recommendation is for repeat of IgM anticardiolipin antibody to be done on or after 08/02/2022  · Based on this result, further determination of diagnosis will be made and accordingly management options will be presented    · In the interim, I recommend the patient ensure says she does not get pregnant in order to provide best practice care given the race clinical concern suspicion  She is agreeable to that with understanding  Follow Up   Scheduled      All questions were answered to the patient's satisfaction during this encounter  They appreciated and thanked me for spending time with them  The patient knows the contact information for our office and know to reach out for any relevant concerns related to this encounter  For all other listed problems and medical diagnosis in his chart - they are managed by PCP and/or other specialists, which patient acknowledges  Dr Racheal Riley MD  Hematology & Medical Oncology

## 2022-06-20 NOTE — TELEPHONE ENCOUNTER
Patient calling to alert that she may be a couple of minutes late for her appointment  I advised her that as long as she is no more than ten minutes late, this will not be an issue

## 2022-06-21 PROBLEM — R76.0 POSITIVE CARDIOLIPIN ANTIBODIES: Status: ACTIVE | Noted: 2022-06-21

## 2022-06-21 PROBLEM — Z87.59 HISTORY OF MISCARRIAGE: Status: ACTIVE | Noted: 2022-06-21

## 2022-08-12 ENCOUNTER — TELEMEDICINE (OUTPATIENT)
Dept: HEMATOLOGY ONCOLOGY | Facility: CLINIC | Age: 32
End: 2022-08-12
Payer: COMMERCIAL

## 2022-08-12 ENCOUNTER — TELEPHONE (OUTPATIENT)
Dept: HEMATOLOGY ONCOLOGY | Facility: CLINIC | Age: 32
End: 2022-08-12

## 2022-08-12 DIAGNOSIS — D68.61 ANTI-PHOSPHOLIPID ANTIBODY SYNDROME (HCC): Primary | ICD-10-CM

## 2022-08-12 DIAGNOSIS — Z87.59 HISTORY OF MISCARRIAGE: ICD-10-CM

## 2022-08-12 DIAGNOSIS — R76.0 POSITIVE CARDIOLIPIN ANTIBODIES: ICD-10-CM

## 2022-08-12 PROBLEM — D89.82: Status: ACTIVE | Noted: 2022-08-12

## 2022-08-12 PROBLEM — D89.82: Status: RESOLVED | Noted: 2022-08-12 | Resolved: 2022-08-12

## 2022-08-12 PROCEDURE — 99214 OFFICE O/P EST MOD 30 MIN: CPT | Performed by: INTERNAL MEDICINE

## 2022-08-12 NOTE — PROGRESS NOTES
[address] [facility] - Hematology & Medical Oncology  Outpatient Visit Encounter Note      Tc Matta 28 y o  female DOB1990 [id number] Date:  8/12/2022      It was my intent to perform this visit via video technology but the patient was not able to do a video connection so the visit was completed via audio telephone only  Telemedicine consent    Patient: Tc Matta  Provider: Odell Becerra MD  Provider located at [address] [place] [address]  [phone]    The patient was identified by name and date of birth  Tc Matta was informed that this is a telemedicine visit and that the visit is being conducted through Telephone  My office door was closed  No one else was in the room  She acknowledged consent and understanding of privacy and security of the video platform  The patient has agreed to participate and understands they can discontinue the visit at any time  Patient is aware this is a billable service  I spent 10 minutes with the patient during this visit  HEMATOLOGICAL HISTORY        Clotting History Denies   Bleeding History Denies   Cancer History Denies   Family Cancer History MGM - Bone   MGF - Colon  PGM - Colon   H/O Blood/Plt Transfusion Denies   Tobacco Use Yes, working to quit   H/O COVID19 Infection Denies   COVID19 Vaccine Status Not         SUBJECTIVE      Tc Matta is a 28 y o  here for new consultation with me today  The patient is referred by fertility specialist and the reason for consultation is concern for potential APLS  In review of the chart and talking with the patient, she says that she has a history of 2 miscarriages as detailed below she is getting workup and management with the fertility physician and upon diagnostic workup there is concerns being raised about a possible antiphospholipid syndrome diagnosis      Labs from [facility] 5/10/22 that patient showed on the phone:    aCL IgM 112 8  acL IgG and IgA were normal  LAC not detected    She has two miscarriages in her lifetime, the first in April 2020 and May 2022 being the second  They were both in first trimester  She has delivered two babies, both were delivered in their third trimster at full or 37 weeks  Denies VTE history  Denies any fevers chills nausea vomiting chest or abdominal pain  Denies any personal history of autoimmune disease but says that there is possible concern about a family history of lupus  THIS VISIT    Contains abnormal data CARDIOLIPIN AUTOABS  Order: [id number]   Ref Range & Units 8/3/22 10:55 AM   Cardiolipin IgG <15 GPL Units <15    Cardiolipin IgA <12 APL Units <12    Cardiolipin IgM <12 5 MPL Units 110 0 High     Specimen Collected: 08/03/22 10:55 AM Last Resulted: 08/08/22  7:52 PM   Received From: [facility]  Result Received: 08/12/22 10:52 AM       No acute complaints  Not currently pregnant  No fevers chills chest or abdominal pain  I have reviewed the relevant past medical, surgical, social and family history  I have also reviewed allergies and medications for this patient  Review of Systems  Review of Systems   All other systems reviewed and are negative  OBJECTIVE     Physical Exam    1  no sign of distress  2  no signs of respiratory distress  3  speaking full sentences  4  alert and oriented  5  organized thought pattern  6  no coughing on the phone  7  no wheezes audible  8  voice is clear, speech not slurred      Imaging  Relevant imaging reviewed in chart    Labs  Relevant labs reviewed in chart   ASSESSMENT & PLAN      Diagnosis ICD-10-CM Associated Orders   1  Anti-phospholipid antibody syndrome (HCC)  D68 61    2  Positive cardiolipin antibodies  R76 0    3   History of miscarriage  Z80 59          28 y o  female with a history of 2 spontaneous first-trimester miscarriages and positive IgM anticardiolipin antibody seen in consultation for concern for possible APLS diagnosis  · Discussion/Plan/Labs  · At this time, with repeat testing that was at least 12 weeks apart, she meets the criteria for diagnosing her with APLS as she meets the the pregnancy morbidity criteria and the lab criteria for clinically significant elevated IgM anticardiolipin antibody   · Given that she has never experienced episodes of either arterial or venous thromboembolism, she has had 2 separate episodes of 1st trimester spontaneous miscarriages that have been around 8 weeks of gestation, a discussion has to take place about how to best approach antepartum, partum and postpartum care  The intention of this approach is to help reduce the risk of thrombosis and associated APLS complications in the Obstetrics situation  · A message has been left to Dr Chas Ballard office to have her contact my cell phone so I can discuss this patient's case with her in further detail  At a minimum, recommendation is going to be for low-dose aspirin to be taken before she gets pregnant and especially when she gets pregnant and then discussion about likely use of prophylactic Lovenox during the entire course of her pregnancy  This is pending discussion with her OBGYN  · 250p = I spoke with Dr Dov Snyder and plan is agreed upon  I spoke and updated patient about this  I have sent script for Aspirin 81mg  Dr Dov Snyder will prescribe the prophylactic Lovenox        Follow Up   To be determined      All questions were answered to the patient's satisfaction during this encounter  They appreciated and thanked me for spending time with them  The patient knows the contact information for our office and know to reach out for any relevant concerns related to this encounter  For all other listed problems and medical diagnosis in his chart - they are managed by PCP and/or other specialists, which patient acknowledges  Dr Virgil Neely MD  Hematology & Medical Oncology

## 2022-08-12 NOTE — TELEPHONE ENCOUNTER
CALL RETURN FORM   Reason for patient call? Patient has been waiting in virtual area for appmt with Dr Laxmi Huggins   Patient's primary oncologist? Laxmi Huggins    Name of person the patient was calling for? Pauline   Any additional information to add, if applicable? [phone]   Informed patient that the message will be forwarded to the team and someone will get back to them as soon as possible    Did you relay this information to the patient?   yes

## 2022-08-16 DIAGNOSIS — D68.61 ANTI-PHOSPHOLIPID ANTIBODY SYNDROME (HCC): Primary | ICD-10-CM

## 2022-08-16 RX ORDER — ASPIRIN 81 MG/1
81 TABLET, CHEWABLE ORAL DAILY
Qty: 30 TABLET | Refills: 2 | Status: SHIPPED | OUTPATIENT
Start: 2022-08-16 | End: 2022-11-14

## 2022-10-17 ENCOUNTER — APPOINTMENT (EMERGENCY)
Dept: ULTRASOUND IMAGING | Facility: HOSPITAL | Age: 32
End: 2022-10-17
Payer: COMMERCIAL

## 2022-10-17 ENCOUNTER — HOSPITAL ENCOUNTER (EMERGENCY)
Facility: HOSPITAL | Age: 32
Discharge: HOME | End: 2022-10-17
Attending: EMERGENCY MEDICINE
Payer: COMMERCIAL

## 2022-10-17 VITALS
TEMPERATURE: 97.5 F | DIASTOLIC BLOOD PRESSURE: 70 MMHG | RESPIRATION RATE: 22 BRPM | HEART RATE: 81 BPM | SYSTOLIC BLOOD PRESSURE: 109 MMHG | OXYGEN SATURATION: 99 %

## 2022-10-17 DIAGNOSIS — R10.13 EPIGASTRIC PAIN: Primary | ICD-10-CM

## 2022-10-17 LAB
ALBUMIN SERPL BCP-MCNC: 4.5 G/DL (ref 3.5–5)
ALP SERPL-CCNC: 66 U/L (ref 46–116)
ALT SERPL W P-5'-P-CCNC: 19 U/L (ref 12–78)
ANION GAP SERPL CALCULATED.3IONS-SCNC: 11 MMOL/L (ref 4–13)
AST SERPL W P-5'-P-CCNC: 24 U/L (ref 5–45)
ATRIAL RATE: 76 BPM
B-HCG SERPL-ACNC: 5948 MIU/ML
BASOPHILS # BLD AUTO: 0.04 THOUSANDS/ÂΜL (ref 0–0.1)
BASOPHILS NFR BLD AUTO: 1 % (ref 0–1)
BILIRUB SERPL-MCNC: 0.45 MG/DL (ref 0.2–1)
BILIRUB UR QL STRIP: NEGATIVE
BUN SERPL-MCNC: 9 MG/DL (ref 5–25)
CALCIUM SERPL-MCNC: 9.6 MG/DL (ref 8.3–10.1)
CARDIAC TROPONIN I PNL SERPL HS: <2 NG/L
CARDIAC TROPONIN I PNL SERPL HS: <2 NG/L
CHLORIDE SERPL-SCNC: 99 MMOL/L (ref 96–108)
CLARITY UR: CLEAR
CO2 SERPL-SCNC: 26 MMOL/L (ref 21–32)
COLOR UR: COLORLESS
CREAT SERPL-MCNC: 0.81 MG/DL (ref 0.6–1.3)
EOSINOPHIL # BLD AUTO: 0.06 THOUSAND/ÂΜL (ref 0–0.61)
EOSINOPHIL NFR BLD AUTO: 1 % (ref 0–6)
ERYTHROCYTE [DISTWIDTH] IN BLOOD BY AUTOMATED COUNT: 11.9 % (ref 11.6–15.1)
GFR SERPL CREATININE-BSD FRML MDRD: 96 ML/MIN/1.73SQ M
GLUCOSE SERPL-MCNC: 88 MG/DL (ref 65–140)
GLUCOSE UR STRIP-MCNC: NEGATIVE MG/DL
HCT VFR BLD AUTO: 42.7 % (ref 34.8–46.1)
HGB BLD-MCNC: 14.7 G/DL (ref 11.5–15.4)
HGB UR QL STRIP.AUTO: NEGATIVE
IMM GRANULOCYTES # BLD AUTO: 0.02 THOUSAND/UL (ref 0–0.2)
IMM GRANULOCYTES NFR BLD AUTO: 0 % (ref 0–2)
KETONES UR STRIP-MCNC: NEGATIVE MG/DL
LEUKOCYTE ESTERASE UR QL STRIP: NEGATIVE
LIPASE SERPL-CCNC: 103 U/L (ref 73–393)
LYMPHOCYTES # BLD AUTO: 1.13 THOUSANDS/ÂΜL (ref 0.6–4.47)
LYMPHOCYTES NFR BLD AUTO: 18 % (ref 14–44)
MCH RBC QN AUTO: 30.2 PG (ref 26.8–34.3)
MCHC RBC AUTO-ENTMCNC: 34.4 G/DL (ref 31.4–37.4)
MCV RBC AUTO: 88 FL (ref 82–98)
MONOCYTES # BLD AUTO: 0.38 THOUSAND/ÂΜL (ref 0.17–1.22)
MONOCYTES NFR BLD AUTO: 6 % (ref 4–12)
NEUTROPHILS # BLD AUTO: 4.64 THOUSANDS/ÂΜL (ref 1.85–7.62)
NEUTS SEG NFR BLD AUTO: 74 % (ref 43–75)
NITRITE UR QL STRIP: NEGATIVE
NRBC BLD AUTO-RTO: 0 /100 WBCS
P AXIS: 75 DEGREES
PH UR STRIP.AUTO: 6.5 [PH]
PLATELET # BLD AUTO: 203 THOUSANDS/UL (ref 149–390)
PMV BLD AUTO: 9.3 FL (ref 8.9–12.7)
POTASSIUM SERPL-SCNC: 4.5 MMOL/L (ref 3.5–5.3)
PR INTERVAL: 124 MS
PROT SERPL-MCNC: 8.2 G/DL (ref 6.4–8.4)
PROT UR STRIP-MCNC: NEGATIVE MG/DL
QRS AXIS: 88 DEGREES
QRSD INTERVAL: 80 MS
QT INTERVAL: 366 MS
QTC INTERVAL: 411 MS
RBC # BLD AUTO: 4.86 MILLION/UL (ref 3.81–5.12)
SODIUM SERPL-SCNC: 136 MMOL/L (ref 135–147)
SP GR UR STRIP.AUTO: 1 (ref 1–1.03)
T WAVE AXIS: 50 DEGREES
UROBILINOGEN UR STRIP-ACNC: <2 MG/DL
VENTRICULAR RATE: 76 BPM
WBC # BLD AUTO: 6.27 THOUSAND/UL (ref 4.31–10.16)

## 2022-10-17 PROCEDURE — 85025 COMPLETE CBC W/AUTO DIFF WBC: CPT | Performed by: PHYSICIAN ASSISTANT

## 2022-10-17 PROCEDURE — 84484 ASSAY OF TROPONIN QUANT: CPT | Performed by: PHYSICIAN ASSISTANT

## 2022-10-17 PROCEDURE — 80053 COMPREHEN METABOLIC PANEL: CPT | Performed by: PHYSICIAN ASSISTANT

## 2022-10-17 PROCEDURE — 76815 OB US LIMITED FETUS(S): CPT

## 2022-10-17 PROCEDURE — 81003 URINALYSIS AUTO W/O SCOPE: CPT | Performed by: PHYSICIAN ASSISTANT

## 2022-10-17 PROCEDURE — 36415 COLL VENOUS BLD VENIPUNCTURE: CPT | Performed by: PHYSICIAN ASSISTANT

## 2022-10-17 PROCEDURE — 99285 EMERGENCY DEPT VISIT HI MDM: CPT

## 2022-10-17 PROCEDURE — 83690 ASSAY OF LIPASE: CPT | Performed by: PHYSICIAN ASSISTANT

## 2022-10-17 PROCEDURE — 84702 CHORIONIC GONADOTROPIN TEST: CPT | Performed by: PHYSICIAN ASSISTANT

## 2022-10-17 PROCEDURE — 96374 THER/PROPH/DIAG INJ IV PUSH: CPT

## 2022-10-17 PROCEDURE — 93005 ELECTROCARDIOGRAM TRACING: CPT

## 2022-10-17 PROCEDURE — 93010 ELECTROCARDIOGRAM REPORT: CPT | Performed by: INTERNAL MEDICINE

## 2022-10-17 PROCEDURE — 99283 EMERGENCY DEPT VISIT LOW MDM: CPT | Performed by: PHYSICIAN ASSISTANT

## 2022-10-17 RX ORDER — FAMOTIDINE 10 MG/ML
20 INJECTION, SOLUTION INTRAVENOUS ONCE
Status: COMPLETED | OUTPATIENT
Start: 2022-10-17 | End: 2022-10-17

## 2022-10-17 RX ADMIN — FAMOTIDINE 20 MG: 10 INJECTION, SOLUTION INTRAVENOUS at 17:21

## 2022-10-17 NOTE — ED PROVIDER NOTES
History  Chief Complaint   Patient presents with   • Chest Pain     Pt reports having chest pain since 1230 am  Had just started taking Lovenox  5 weeks preg  This is a 70-year-old female patient with history of antiphospholipid antibody syndrome here for evaluation of epigastric pain  Pain began around 12 30 a m  Today  Pain is been constant but waxes and wanes in intensity  She thought it could be reflux as she has had history of reflux and so she took over-the-counter antacids without much relief  The pain worsens when she leans forward  Relieved when she sits back or lays down  Denies fevers chills nausea vomiting shortness of breath  She is approximately 5 weeks pregnant  She just began Lovenox subcutaneous injections because of her history of antiphospholipid antibody  No recent travels traumas or surgeries  No vaginal bleeding or discharge  History provided by:  Patient   used: No    Abdominal Pain  Pain location:  Epigastric  Pain quality: burning    Pain severity:  Moderate  Onset quality:  Sudden  Duration:  16 hours  Timing:  Constant  Progression:  Waxing and waning  Chronicity:  New      Prior to Admission Medications   Prescriptions Last Dose Informant Patient Reported? Taking? Cholecalciferol (Vitamin D3) 50 MCG ( UT) TABS  Self Yes No   Sig: Take 2,000 Units by mouth daily   Coenzyme Q10 (COQ-10 PO)  Self Yes No   Sig: Take 300 mg by mouth 2 (two) times a day   Prenatal Multivit-Min-Fe-FA (PRE-PENNY PO)  Self Yes No   Sig: Take 1 tablet by mouth in the morning   aspirin 81 mg chewable tablet   No No   Sig: Chew 1 tablet (81 mg total) daily      Facility-Administered Medications: None       Past Medical History:   Diagnosis Date   • APS (antiphospholipid syndrome) ([facility])        History reviewed  No pertinent surgical history  History reviewed  No pertinent family history  I have reviewed and agree with the history as documented      E-Cigarette/Vaping E-Cigarette/Vaping Substances     Social History     Tobacco Use   • Smoking status: Current Every Day Smoker     Packs/day: 0 25     Years: 2 00     Pack years: 0 50     Types: Cigarettes   • Smokeless tobacco: Never Used   • Tobacco comment: patient is currently tryinbg to quit, reports bad reaction to the patch   Substance Use Topics   • Alcohol use: No   • Drug use: No       Review of Systems   Gastrointestinal: Positive for abdominal pain  Physical Exam  Physical Exam  Vitals and nursing note reviewed  Constitutional:       General: She is not in acute distress  Appearance: She is well-developed  HENT:      Head: Normocephalic and atraumatic  Eyes:      Conjunctiva/sclera: Conjunctivae normal    Cardiovascular:      Rate and Rhythm: Normal rate and regular rhythm  Heart sounds: No murmur heard  Pulmonary:      Effort: Pulmonary effort is normal  No respiratory distress  Breath sounds: Normal breath sounds  Abdominal:      Palpations: Abdomen is soft  Tenderness: There is abdominal tenderness in the epigastric area  Musculoskeletal:      Cervical back: Neck supple  Skin:     General: Skin is warm and dry  Neurological:      Mental Status: She is alert           Vital Signs  ED Triage Vitals   Temperature Pulse Respirations Blood Pressure SpO2   10/17/22 1631 10/17/22 1631 10/17/22 1631 10/17/22 1631 10/17/22 1631   97 5 °F (36 4 °C) 95 19 112/82 99 %      Temp src Heart Rate Source Patient Position - Orthostatic VS BP Location FiO2 (%)   -- 10/17/22 1730 10/17/22 1808 10/17/22 1808 --    Monitor Sitting Left arm       Pain Score       10/17/22 1631       8           Vitals:    10/17/22 1808 10/17/22 1830 10/17/22 2000 10/17/22 2021   BP: 108/66 112/71 109/70    Pulse: 82 77 78 81   Patient Position - Orthostatic VS: Sitting Lying Sitting          Visual Acuity      ED Medications  Medications   Famotidine (PF) (PEPCID) injection 20 mg (20 mg Intravenous Given 10/17/22 1721)       Diagnostic Studies  Results Reviewed     Procedure Component Value Units Date/Time    HS Troponin I 2hr [[id number]] Collected: 10/17/22 2000    Lab Status:  In process Specimen: Blood from Arm, Left Updated: 10/17/22 2003    HS Troponin I 4hr [[id number]]     Lab Status: No result Specimen: Blood     Comprehensive metabolic panel [[id number]] Collected: 10/17/22 1718    Lab Status: Final result Specimen: Blood from Arm, Left Updated: 10/17/22 1821     Sodium 136 mmol/L      Potassium 4 5 mmol/L      Chloride 99 mmol/L      CO2 26 mmol/L      ANION GAP 11 mmol/L      BUN 9 mg/dL      Creatinine 0 81 mg/dL      Glucose 88 mg/dL      Calcium 9 6 mg/dL      AST 24 U/L      ALT 19 U/L      Alkaline Phosphatase 66 U/L      Total Protein 8 2 g/dL      Albumin 4 5 g/dL      Total Bilirubin 0 45 mg/dL      eGFR 96 ml/min/1 73sq m     Narrative:      [place] guidelines for Chronic Kidney Disease (CKD):   •  Stage 1 with normal or high GFR (GFR > 90 mL/min/1 73 square meters)  •  Stage 2 Mild CKD (GFR = 60-89 mL/min/1 73 square meters)  •  Stage 3A Moderate CKD (GFR = 45-59 mL/min/1 73 square meters)  •  Stage 3B Moderate CKD (GFR = 30-44 mL/min/1 73 square meters)  •  Stage 4 Severe CKD (GFR = 15-29 mL/min/1 73 square meters)  •  Stage 5 End Stage CKD (GFR <15 mL/min/1 73 square meters)  Note: GFR calculation is accurate only with a steady state creatinine    Lipase [[id number]]  (Normal) Collected: 10/17/22 1718    Lab Status: Final result Specimen: Blood from Arm, Left Updated: 10/17/22 1821     Lipase 103 u/L     hCG, quantitative [[id number]]  (Abnormal) Collected: 10/17/22 1718    Lab Status: Final result Specimen: Blood from Arm, Left Updated: 10/17/22 1821     HCG, Quant 5,948 mIU/mL     Narrative:       Expected Ranges:     Approximate               Approximate HCG  Gestation age          Concentration ( mIU/mL)  _____________          ______________________   Sue Segun HCG values  0 2-1                       5-50  1-2                           2-3                         100-5000  3-4                         500-10172  4-5                         1000-41268  5-6                         [id number]  6-8                         34963-149165  8-12                        54448-317990      HS Troponin 0hr (reflex protocol) [[id number]]  (Normal) Collected: 10/17/22 1718    Lab Status: Final result Specimen: Blood from Arm, Left Updated: 10/17/22 1809     hs TnI 0hr <2 ng/L     UA w Reflex to Microscopic w Reflex to Culture [[id number]] Collected: 10/17/22 1718    Lab Status: Final result Specimen: Urine, Clean Catch Updated: 10/17/22 1744     Color, UA Colorless     Clarity, UA Clear     Specific Gravity, UA 1 004     pH, UA 6 5     Leukocytes, UA Negative     Nitrite, UA Negative     Protein, UA Negative mg/dl      Glucose, UA Negative mg/dl      Ketones, UA Negative mg/dl      Urobilinogen, UA <2 0 mg/dl      Bilirubin, UA Negative     Occult Blood, UA Negative    CBC and differential [[id number]] Collected: 10/17/22 1718    Lab Status: Final result Specimen: Blood from Arm, Left Updated: 10/17/22 1723     WBC 6 27 Thousand/uL      RBC 4 86 Million/uL      Hemoglobin 14 7 g/dL      Hematocrit 42 7 %      MCV 88 fL      MCH 30 2 pg      MCHC 34 4 g/dL      RDW 11 9 %      MPV 9 3 fL      Platelets 138 Thousands/uL      nRBC 0 /100 WBCs      Neutrophils Relative 74 %      Immat GRANS % 0 %      Lymphocytes Relative 18 %      Monocytes Relative 6 %      Eosinophils Relative 1 %      Basophils Relative 1 %      Neutrophils Absolute 4 64 Thousands/µL      Immature Grans Absolute 0 02 Thousand/uL      Lymphocytes Absolute 1 13 Thousands/µL      Monocytes Absolute 0 38 Thousand/µL      Eosinophils Absolute 0 06 Thousand/µL      Basophils Absolute 0 04 Thousands/µL                  US OB pregnancy limited with transvaginal   Final Result by Wendy Kahn MD (10/17 1955)   Intrauterine saclike structure with suggestion of surrounding decidual reaction  No discrete yolk sac or fetal pole is identified  Differential remains early IUP, spontaneous  and ectopic pregnancy  Correlate with serial quantitative BHCG  Workstation performed: [id number]                    Procedures  ECG 12 Lead Documentation Only    Date/Time: 10/17/2022 8:35 PM  Performed by: Betty French PA-C  Authorized by: Betty French PA-C     ECG reviewed by me, the ED Provider: yes    Patient location:  ED  Interpretation:     Interpretation: normal    Quality:     Tracing quality:  Limited by artifact  Rate:     ECG rate:  76    ECG rate assessment: normal    Rhythm:     Rhythm: sinus rhythm    Ectopy:     Ectopy: none    QRS:     QRS axis:  Normal    QRS intervals:  Normal  Conduction:     Conduction: normal    ST segments:     ST segments:  Normal  T waves:     T waves: normal               ED Course             HEART Risk Score    Flowsheet Row Most Recent Value   Heart Score Risk Calculator    History 0 Filed at: 10/17/2022 2037   ECG 0 Filed at: 10/17/2022 2037   Age 0 Filed at: 10/17/2022 2037   Risk Factors 0 Filed at: 10/17/2022 2037   Troponin 0 Filed at: 10/17/2022 2037   HEART Score 0 Filed at: 10/17/2022 2037                                      MDM  Number of Diagnoses or Management Options  Epigastric pain: new and requires workup  Diagnosis management comments: DDx including but not limited to: threatened , ectopic pregnancy, ovarian torsion, ovarian cyst, ruptured ovarian cyst, mesenteric adenitis, gastritis, PUD, GERD, gastroparesis, pancreatitis, hepatitis, IBD, IBS, ileus, colitis, enteritis, renal colic, pyelonephritis, UTI, biliary colic, choledocholithiasis, perforated viscus, splenic etiology, constipation; doubt bowel obstruction or appendicitis or cholecystitis or volvulus  plan:  EKG troponin  Labs  Lipase  Ultrasound rule out ectopic  Dispo pending      Risk of Complications, Morbidity, and/or Mortality  Presenting problems: moderate  Management options: low  General comments:   72-year-old female 5 weeks pregnant with chest and abdominal pain  Epigastric  She is on Lovenox for antiphospholipid antibody syndrome  At this point no suspicion for PE given her pain is epigastric and reproducible  Suspect this could be GERD versus peptic ulcer disease  She agrees with deferring workup for PE at this time as overall suspicion is low and she is already taking Lovenox  No definite IUP  She will f/u with OB and PCP  Return parameters provided  Pt understands and agrees with plan  Patient Progress  Patient progress: stable      Disposition  Final diagnoses:   Epigastric pain     Time reflects when diagnosis was documented in both MDM as applicable and the Disposition within this note     Time User Action Codes Description Comment    10/17/2022  8:11 PM Jenniffer Come Add [R07 9] Chest pain     10/17/2022  8:13 PM Curly Eubanks L Add [R10 13] Epigastric pain     10/17/2022  8:13 PM Jenniffer Come Modify [R10 13] Epigastric pain     10/17/2022  8:13 PM Jenniffer Come Remove [R07 9] Chest pain       ED Disposition     ED Disposition   Discharge    Condition   Stable    Date/Time   Mon Oct 17, 2022  8:11 PM    Comment   Shakeel Celestin discharge to home/self care                 Follow-up Information     Follow up With Specialties Details Why Contact Info Additional 2000 [facility] Emergency Department Emergency Medicine Go to  If symptoms worsen [address] Emergency Department, [address] [place] [address], [address], [place], [address]          Discharge Medication List as of 10/17/2022  8:13 PM      CONTINUE these medications which have NOT CHANGED    Details   aspirin 81 mg chewable tablet Chew 1 tablet (81 mg total) daily, Starting Tue 8/16/2022, Until Mon 2022, Normal      Cholecalciferol (Vitamin D3) 50 MCG (2000) TABS Take 2,000 Units by mouth daily, Historical Med      Coenzyme Q10 (COQ-10 PO) Take 300 mg by mouth 2 (two) times a day, Historical Med      Prenatal Multivit-Min-Fe-FA (PRE-PENNY PO) Take 1 tablet by mouth in the morning, Historical Med             No discharge procedures on file      PDMP Review     None          ED Provider  Electronically Signed by           Екатерина Ross PA-C  10/17/22 2038

## 2022-10-18 NOTE — ED NOTES
Alert oriented in no distress  No c/o offered   Discharged ambulatory to home     Tno Franklin RN  10/17/22 2022

## 2023-04-02 ENCOUNTER — HOSPITAL ENCOUNTER (EMERGENCY)
Facility: HOSPITAL | Age: 33
Discharge: HOME | End: 2023-04-02
Attending: EMERGENCY MEDICINE

## 2023-04-02 VITALS
HEART RATE: 88 BPM | DIASTOLIC BLOOD PRESSURE: 65 MMHG | RESPIRATION RATE: 18 BRPM | SYSTOLIC BLOOD PRESSURE: 104 MMHG | OXYGEN SATURATION: 97 % | TEMPERATURE: 97.9 F

## 2023-04-02 DIAGNOSIS — K64.9 HEMORRHOIDS: Primary | ICD-10-CM

## 2023-04-02 RX ORDER — LIDOCAINE 40 MG/G
CREAM TOPICAL ONCE
Status: COMPLETED | OUTPATIENT
Start: 2023-04-02 | End: 2023-04-02

## 2023-04-02 RX ORDER — FENTANYL CITRATE 50 UG/ML
25 INJECTION, SOLUTION INTRAMUSCULAR; INTRAVENOUS ONCE
Status: COMPLETED | OUTPATIENT
Start: 2023-04-02 | End: 2023-04-02

## 2023-04-02 RX ADMIN — FENTANYL CITRATE 25 MCG: 50 INJECTION, SOLUTION INTRAMUSCULAR; INTRAVENOUS at 18:06

## 2023-04-02 RX ADMIN — FENTANYL CITRATE 25 MCG: 50 INJECTION INTRAMUSCULAR; INTRAVENOUS at 18:13

## 2023-04-02 RX ADMIN — LIDOCAINE 1 APPLICATION.: 40 CREAM TOPICAL at 20:07

## 2023-04-02 NOTE — DISCHARGE INSTRUCTIONS
Follow up with PCP  Follow up with OBGYN  Follow up with Colorectal  Metamucil once a day  Miralax as needed   Continue supportive care  Return to the ED with new or worsening symptoms including but not limited to worsening pain, bleeding

## 2023-04-02 NOTE — ED PROVIDER NOTES
History  Chief Complaint   Patient presents with   • Hemorrhoids     C/o painful hemorrhoids, 29 wks pregnant  Patient is a 75-year-old female with a past medical history of antiphospholipid syndrome presenting to the emergency department for evaluation of hemorrhoids  Patient reports she is 29 weeks pregnant and began having hemorrhoids yesterday  Patient reports hemorrhoids have worsened from yesterday to today  Patient reports she has tried sitz bath's, Tucks pads, Proctofoam, and Preparation H yesterday  Patient reports she has had no relief of the pain in her chest worsens  Patient reports she is having a difficult time sitting secondary to the pain  Patient reports she did contact her OB/GYN yesterday and was given Proctofoam  Denies fevers, chills, rash, headache, weakness, dizziness, visual changes, abdominal pain, nausea, vomiting, diarrhea, constipation, chest pain, shortness of breath or difficulty breathing  Does not offer any other concerns or complaints  Prior to Admission Medications   Prescriptions Last Dose Informant Patient Reported? Taking? Cholecalciferol (Vitamin D3) 50 MCG ( UT) TABS   Yes No   Sig: Take 2,000 Units by mouth daily   Coenzyme Q10 (COQ-10 PO)   Yes No   Sig: Take 300 mg by mouth 2 (two) times a day   Prenatal Multivit-Min-Fe-FA (PRE- PO)   Yes No   Sig: Take 1 tablet by mouth in the morning   aspirin 81 mg chewable tablet   No No   Sig: Chew 1 tablet (81 mg total) daily      Facility-Administered Medications: None       Past Medical History:   Diagnosis Date   • APS (antiphospholipid syndrome) ([facility])        History reviewed  No pertinent surgical history  History reviewed  No pertinent family history  I have reviewed and agree with the history as documented      E-Cigarette/Vaping     E-Cigarette/Vaping Substances     Social History     Tobacco Use   • Smoking status: Every Day     Packs/day: 0 25     Years: 2 00     Pack years: 0 50     Types: Cigarettes   • Smokeless tobacco: Never   • Tobacco comments:     patient is currently tryinbg to quit, reports bad reaction to the patch   Substance Use Topics   • Alcohol use: No   • Drug use: No       Review of Systems   Constitutional: Negative for chills and fever  HENT: Negative for ear pain and sore throat  Eyes: Negative for pain and visual disturbance  Respiratory: Negative for cough and shortness of breath  Cardiovascular: Negative for chest pain and palpitations  Gastrointestinal: Negative for abdominal pain and vomiting  Genitourinary: Negative for dysuria and hematuria  Hemorrhoids   Musculoskeletal: Negative for arthralgias and back pain  Skin: Negative for color change and rash  Neurological: Negative for seizures and syncope  All other systems reviewed and are negative  Physical Exam  Physical Exam  Vitals and nursing note reviewed  Constitutional:       General: She is not in acute distress  Appearance: Normal appearance  She is well-developed  She is not ill-appearing, toxic-appearing or diaphoretic  HENT:      Head: Normocephalic and atraumatic  Right Ear: External ear normal       Left Ear: External ear normal       Nose: Nose normal       Mouth/Throat:      Mouth: Mucous membranes are moist    Eyes:      General: No scleral icterus  Right eye: No discharge  Left eye: No discharge  Conjunctiva/sclera: Conjunctivae normal    Cardiovascular:      Rate and Rhythm: Normal rate and regular rhythm  Heart sounds: No murmur heard  Pulmonary:      Effort: Pulmonary effort is normal  No respiratory distress  Breath sounds: Normal breath sounds  Abdominal:      Palpations: Abdomen is soft  Tenderness: There is no abdominal tenderness  Genitourinary:     Rectum: Tenderness present        Comments: Four large external hemorrhoids versus rectal prolapse  Musculoskeletal:         General: No swelling, deformity or signs of injury  Normal range of motion  Cervical back: Normal range of motion and neck supple  No rigidity  Skin:     General: Skin is warm and dry  Capillary Refill: Capillary refill takes less than 2 seconds  Coloration: Skin is not jaundiced  Findings: No erythema or rash  Neurological:      General: No focal deficit present  Mental Status: She is alert and oriented to person, place, and time  Mental status is at baseline  Cranial Nerves: No cranial nerve deficit  Gait: Gait normal    Psychiatric:         Mood and Affect: Mood normal          Behavior: Behavior normal          Thought Content: Thought content normal          Judgment: Judgment normal          Vital Signs  ED Triage Vitals   Temperature Pulse Respirations Blood Pressure SpO2   04/02/23 1718 04/02/23 1718 04/02/23 1718 04/02/23 1718 04/02/23 1718   97 9 °F (36 6 °C) (!) 131 19 100/61 98 %      Temp Source Heart Rate Source Patient Position - Orthostatic VS BP Location FiO2 (%)   04/02/23 1718 04/02/23 1718 04/02/23 1718 04/02/23 1718 --   Tympanic Monitor Standing Left arm       Pain Score       04/02/23 1806       10 - Worst Possible Pain           Vitals:    04/02/23 1718 04/02/23 1815 04/02/23 1830   BP: 100/61 112/66 104/65   Pulse: (!) 131 101 88   Patient Position - Orthostatic VS: Standing Lying Lying         Visual Acuity      ED Medications  Medications   fentanyl citrate (PF) 100 MCG/2ML 25 mcg (25 mcg Intravenous Given 4/2/23 1806)   fentanyl citrate (PF) 100 MCG/2ML 25 mcg (25 mcg Intravenous Given 4/2/23 1813)   lidocaine (LMX) 4 % cream (1 application   Topical Given 4/2/23 2007)       Diagnostic Studies  Results Reviewed     None                 No orders to display              Procedures  Procedures         ED Course  ED Course as of 04/02/23 2350   Sun Apr 02, 2023   1751 Discussed with Dr Reese ARGUETA, on call via TT, agreeable to use fentanyl for pain control during reduction of hemorrhoids vs rectal prolapse   1905 Colorectal recommending Metamucil once daily, Miralax as needed would be options  Comfort care with topical Desitin  Lidocaine gel           SBIRT 22yo+    Flowsheet Row Most Recent Value   SBIRT (23 yo +)    In order to provide better care to our patients, we are screening all of our patients for alcohol and drug use  Would it be okay to ask you these screening questions? No Filed at: 04/02/2023 0464                    Medical Decision Making     This is a 35-year-old female senting to the emergency department for evaluation of hemorrhoids  Patient reports she began having pain at her rectum yesterday and felt hemorrhoids  Patient reports she was unable to reduce them at home  Patient reports she has tried sitz bath's, Preparation H, Proctofoam, stool softeners and Tucks pads without alleviation  Reports she is 29 weeks pregnant and reached out to her OB/GYN who prescribed her Proctofoam   Patient reports having pain with sitting, worsening with bowel movements  Differential diagnosis to include but is not limited to: External hemorrhoid, rectal prolapse    Initial ED Plan: Further evaluation of the rectum shows for external hemorrhoids, 1 is partially thrombosed  Rectum is not prolapsed  -Fetal heart tones were assessed at bedside,     ED results: Hemorrhoids were able to be reduced with slight difficulty  Patient reports slight alleviation of pain with reduction   -Discussed with colorectal surgery, Dr Lizy Peterson, recommending Metamucil once daily, MiraLAX as needed and comfort care including topical Desitin and lidocaine gel  Does not recommend banding or surgery for the patient at this time  Final ED assessment: Patient is stable and well appearing  Discussed follow-up with PCP  Discussed recommendations as given by colorectal surgery  Discussed follow-up with colorectal surgery after pregnancy for further management and care of hemorrhoids    Discussed follow-up with OB/GYN  Strict return precautions were discussed including but not limited to worsening pain, rectal bleeding, fevers, decreased bowel movement  Patient verbalized understanding and is agreeable with the plan for discharge  Risk  OTC drugs  Prescription drug management  Disposition  Final diagnoses:   Hemorrhoids     Time reflects when diagnosis was documented in both MDM as applicable and the Disposition within this note     Time User Action Codes Description Comment    2023  7:26 PM Cheryl Armstrong Add [K64 9] Hemorrhoids       ED Disposition     ED Disposition   Discharge    Condition   Stable    Date/Time   Sun 2023  7:26 PM    Comment   Dorys Salome discharge to home/self care                 Follow-up Information     Follow up With Specialties Details Why Contact Info Additional Information    your PCP  Call in 3 days For follow up      Your OBGYN  Call in 3 days For follow up      [address] [facility] Emergency Department Emergency Medicine Go to  If symptoms worsen 34 [facility] 109 [facility] Emergency Department, [address] [place], [place], [address] [place] [address] Colon and Rectal Surgery [facility] [place] [facility] Call   [address] [facility] [address] [address]  [address] [place] [address] Colon and Rectal Surgery [facility] [place], [address] [facility] 43, [address] [facility], [facility] [place] , [address] [place] [address], [phone]          Discharge Medication List as of 2023  7:30 PM      CONTINUE these medications which have NOT CHANGED    Details   aspirin 81 mg chewable tablet Chew 1 tablet (81 mg total) daily, Starting 2022, Until Mon 2022, Normal      Cholecalciferol (Vitamin D3) 50 MCG (2000) TABS Take 2,000 Units by mouth daily, Historical Med      Coenzyme Q10 (COQ-10 PO) Take 300 mg by mouth 2 (two) times a day, Historical Med      Prenatal Multivit-Min-Fe-FA (PRE- PO) Take 1 tablet by mouth in the morning, Historical Med             No discharge procedures on file      PDMP Review     None          ED Provider  Electronically Signed by           Sally Erickson PA-C  04/02/23 6264